# Patient Record
Sex: FEMALE | Race: OTHER | HISPANIC OR LATINO | URBAN - METROPOLITAN AREA
[De-identification: names, ages, dates, MRNs, and addresses within clinical notes are randomized per-mention and may not be internally consistent; named-entity substitution may affect disease eponyms.]

---
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
## 2022-03-19 NOTE — ED PEDIATRIC NURSE NOTE - PMH
A/P: 89 male who presents from a NH with coffee ground emesis, aspiration pneumonia, and a  pseudomonal UTI    Plan:  ICU    PULM: Aspiration PNA.  Continue 50% VM.  Continue Zosyn, CXR showing worsening B/L infiltrates.  Likely aspiration    Cardio: Increase lopressor to 25 q8h for better rate control    Renal: In MÓNICA improved    ENDO: RISS    GI: Continue Feeds, PPI    ID: Zosyn for PSA in the Urine and Asp PNA    GOC: Patient has been a DNR/DNI in the past.  Awaiting the sons decision No pertinent past medical history

## 2022-04-06 NOTE — ED PEDIATRIC NURSE NOTE - NS ED PATIENT SAFETY CONCERN
Faxed cardiac clearance to Dr. Gian Stewart.  Fax# [phone].  Faxed confirmation received./ MANNY      No

## 2022-05-16 NOTE — ED PROVIDER NOTE - CPE EDP RESP NORM
TELEPHONIC VISIT PROGRESS NOTE    This visit is being performed virtually via Telephonic Visit. Consent to treat includes permission to submit charges to the patient's insurance. It was shared that without being seen and evaluated in person, there is a risk that the information and/or assessment may be incomplete or inaccurate. This telephonic visit may be discontinued by patient or clinician, if it is felt that the telephonic connections are not adequate for her situation.     Clinical Location: [address]'s location Home and is physically present in   the [facility] at the time of this visit.       CHIEF COMPLAINT  Transitional Care Management (D/C 05-11-22) and Forms Completion ([place])      SUBJECTIVE  The patient is a 59 year old female who is being evaluated via a Telephonic Visit for medical follow-up after recent hospital stay related to COVID patient was admitted to the hospital on 05/09/2022 related to COVID symptoms including generalized fatigue, chills, body aches  She apparently was possibly exposed to COVID patient in work.  Already has received the 1st 2 doses of Moderna not had the booster.  No issues with breathing but did have elevated lactic acid so was given fluids and monitored in the hospital.  Patient states chills and body aches are better but she states she feels confused and will to think clearly.  Denies any diarrhea or fever chills.  Patient states does have issues with attention deficit disorder.  States feels like it is increased again.  Patient states her mind is wandering and unable to focus  Patient states today was the 1st day to come back to work and she states she feels very fatigued and even falling asleep at work she states her mind is wondering and unable to focus she is unable to function at work  REVIEW OF SYSTEMS  All systems reviewed and are negative with the exception of the findings noted in the history of present  illness.     PHYSICAL EXAM  There were no vitals filed for this visit.   She is alert, nontoxic with fluent speech      ASSESSMENT/PLAN  There are no diagnoses linked to this encounter.  1. Patient was diagnosed with COVID-19 on 05/09/2022 with hospitalization related to elevated lactic acid and dehydration.  Patient states has been trying to increase fluid intake  2.  ADD but patient to restart Adderall 10 mg daily.  3.  Major depression recurrent--on sertraline and seems to be doing fair  .  Plan follow-up in 2 weeks.  A note was written for employment that I would like to patient to be off of work from 05/09/2022 till 06/16/2022   I plan on seeing patient in the end of May to see how she is doing and see if she can return sooner  No LOS data to display  This includes pre-charting, chart review and documenting.    FOLLOW UP  Return in about 2 weeks (around 5/30/2022).  Approximately 35 minutes was spent on seeing patient, making recommendations, entering orders, and putting notes to Epic.  Greater than 50% of this visit was spent in counseling and coordination of care regarding this patient's medical condition.   normal...

## 2023-09-06 ENCOUNTER — EMERGENCY (EMERGENCY)
Facility: HOSPITAL | Age: 23
LOS: 1 days | Discharge: HOME | End: 2023-09-06
Attending: EMERGENCY MEDICINE
Payer: SELF-PAY

## 2023-09-06 VITALS
DIASTOLIC BLOOD PRESSURE: 74 MMHG | SYSTOLIC BLOOD PRESSURE: 106 MMHG | OXYGEN SATURATION: 99 % | RESPIRATION RATE: 20 BRPM

## 2023-09-06 VITALS
DIASTOLIC BLOOD PRESSURE: 75 MMHG | SYSTOLIC BLOOD PRESSURE: 109 MMHG | WEIGHT: 143.08 LBS | HEART RATE: 63 BPM | RESPIRATION RATE: 20 BRPM | OXYGEN SATURATION: 98 % | HEIGHT: 63 IN | TEMPERATURE: 98 F

## 2023-09-06 LAB
ALBUMIN SERPL ELPH-MCNC: 4 G/DL — SIGNIFICANT CHANGE UP (ref 3.3–5)
ALP SERPL-CCNC: 65 U/L — SIGNIFICANT CHANGE UP (ref 40–120)
ALT FLD-CCNC: 13 U/L — SIGNIFICANT CHANGE UP (ref 10–45)
ANION GAP SERPL CALC-SCNC: 10 MMOL/L — SIGNIFICANT CHANGE UP (ref 5–17)
APPEARANCE UR: ABNORMAL
AST SERPL-CCNC: 12 U/L — SIGNIFICANT CHANGE UP (ref 10–40)
BACTERIA # UR AUTO: ABNORMAL
BASOPHILS # BLD AUTO: 0.05 K/UL — SIGNIFICANT CHANGE UP (ref 0–0.2)
BASOPHILS NFR BLD AUTO: 0.5 % — SIGNIFICANT CHANGE UP (ref 0–2)
BILIRUB SERPL-MCNC: 0.2 MG/DL — SIGNIFICANT CHANGE UP (ref 0.2–1.2)
BILIRUB UR-MCNC: NEGATIVE — SIGNIFICANT CHANGE UP
BUN SERPL-MCNC: 14 MG/DL — SIGNIFICANT CHANGE UP (ref 7–23)
CALCIUM SERPL-MCNC: 8.9 MG/DL — SIGNIFICANT CHANGE UP (ref 8.4–10.5)
CHLORIDE SERPL-SCNC: 104 MMOL/L — SIGNIFICANT CHANGE UP (ref 96–108)
CO2 SERPL-SCNC: 22 MMOL/L — SIGNIFICANT CHANGE UP (ref 22–31)
COLOR SPEC: COLORLESS — SIGNIFICANT CHANGE UP
CREAT SERPL-MCNC: 0.59 MG/DL — SIGNIFICANT CHANGE UP (ref 0.5–1.3)
DIFF PNL FLD: ABNORMAL
EGFR: 132 ML/MIN/1.73M2 — SIGNIFICANT CHANGE UP
EOSINOPHIL # BLD AUTO: 0.17 K/UL — SIGNIFICANT CHANGE UP (ref 0–0.5)
EOSINOPHIL NFR BLD AUTO: 1.6 % — SIGNIFICANT CHANGE UP (ref 0–6)
EPI CELLS # UR: 5 /HPF — SIGNIFICANT CHANGE UP
GLUCOSE SERPL-MCNC: 98 MG/DL — SIGNIFICANT CHANGE UP (ref 70–99)
GLUCOSE UR QL: NEGATIVE — SIGNIFICANT CHANGE UP
HCG SERPL-ACNC: <2 MIU/ML — SIGNIFICANT CHANGE UP
HCT VFR BLD CALC: 35 % — SIGNIFICANT CHANGE UP (ref 34.5–45)
HGB BLD-MCNC: 11.8 G/DL — SIGNIFICANT CHANGE UP (ref 11.5–15.5)
HYALINE CASTS # UR AUTO: 2 /LPF — SIGNIFICANT CHANGE UP (ref 0–2)
IMM GRANULOCYTES NFR BLD AUTO: 0.5 % — SIGNIFICANT CHANGE UP (ref 0–0.9)
KETONES UR-MCNC: NEGATIVE — SIGNIFICANT CHANGE UP
LEUKOCYTE ESTERASE UR-ACNC: ABNORMAL
LYMPHOCYTES # BLD AUTO: 2.49 K/UL — SIGNIFICANT CHANGE UP (ref 1–3.3)
LYMPHOCYTES # BLD AUTO: 23.7 % — SIGNIFICANT CHANGE UP (ref 13–44)
MCHC RBC-ENTMCNC: 31.2 PG — SIGNIFICANT CHANGE UP (ref 27–34)
MCHC RBC-ENTMCNC: 33.7 GM/DL — SIGNIFICANT CHANGE UP (ref 32–36)
MCV RBC AUTO: 92.6 FL — SIGNIFICANT CHANGE UP (ref 80–100)
MONOCYTES # BLD AUTO: 0.69 K/UL — SIGNIFICANT CHANGE UP (ref 0–0.9)
MONOCYTES NFR BLD AUTO: 6.6 % — SIGNIFICANT CHANGE UP (ref 2–14)
NEUTROPHILS # BLD AUTO: 7.07 K/UL — SIGNIFICANT CHANGE UP (ref 1.8–7.4)
NEUTROPHILS NFR BLD AUTO: 67.1 % — SIGNIFICANT CHANGE UP (ref 43–77)
NITRITE UR-MCNC: NEGATIVE — SIGNIFICANT CHANGE UP
NRBC # BLD: 0 /100 WBCS — SIGNIFICANT CHANGE UP (ref 0–0)
PH UR: 6 — SIGNIFICANT CHANGE UP (ref 5–8)
PLATELET # BLD AUTO: 339 K/UL — SIGNIFICANT CHANGE UP (ref 150–400)
POTASSIUM SERPL-MCNC: 4.1 MMOL/L — SIGNIFICANT CHANGE UP (ref 3.5–5.3)
POTASSIUM SERPL-SCNC: 4.1 MMOL/L — SIGNIFICANT CHANGE UP (ref 3.5–5.3)
PROT SERPL-MCNC: 7.3 G/DL — SIGNIFICANT CHANGE UP (ref 6–8.3)
PROT UR-MCNC: ABNORMAL
RBC # BLD: 3.78 M/UL — LOW (ref 3.8–5.2)
RBC # FLD: 12 % — SIGNIFICANT CHANGE UP (ref 10.3–14.5)
RBC CASTS # UR COMP ASSIST: 24 /HPF — HIGH (ref 0–4)
SODIUM SERPL-SCNC: 136 MMOL/L — SIGNIFICANT CHANGE UP (ref 135–145)
SP GR SPEC: 1.02 — SIGNIFICANT CHANGE UP (ref 1.01–1.02)
UROBILINOGEN FLD QL: NEGATIVE — SIGNIFICANT CHANGE UP
WBC # BLD: 10.52 K/UL — HIGH (ref 3.8–10.5)
WBC # FLD AUTO: 10.52 K/UL — HIGH (ref 3.8–10.5)
WBC UR QL: 470 /HPF — HIGH (ref 0–5)

## 2023-09-06 PROCEDURE — 87086 URINE CULTURE/COLONY COUNT: CPT

## 2023-09-06 PROCEDURE — 76770 US EXAM ABDO BACK WALL COMP: CPT

## 2023-09-06 PROCEDURE — 80053 COMPREHEN METABOLIC PANEL: CPT

## 2023-09-06 PROCEDURE — 99285 EMERGENCY DEPT VISIT HI MDM: CPT | Mod: 25

## 2023-09-06 PROCEDURE — 87077 CULTURE AEROBIC IDENTIFY: CPT

## 2023-09-06 PROCEDURE — 85025 COMPLETE CBC W/AUTO DIFF WBC: CPT

## 2023-09-06 PROCEDURE — 93005 ELECTROCARDIOGRAM TRACING: CPT

## 2023-09-06 PROCEDURE — 96374 THER/PROPH/DIAG INJ IV PUSH: CPT

## 2023-09-06 PROCEDURE — 81001 URINALYSIS AUTO W/SCOPE: CPT

## 2023-09-06 PROCEDURE — 84702 CHORIONIC GONADOTROPIN TEST: CPT

## 2023-09-06 PROCEDURE — 99285 EMERGENCY DEPT VISIT HI MDM: CPT

## 2023-09-06 RX ORDER — MORPHINE SULFATE 50 MG/1
4 CAPSULE, EXTENDED RELEASE ORAL ONCE
Refills: 0 | Status: DISCONTINUED | OUTPATIENT
Start: 2023-09-06 | End: 2023-09-06

## 2023-09-06 RX ORDER — CEFPODOXIME PROXETIL 100 MG
1 TABLET ORAL
Qty: 20 | Refills: 0
Start: 2023-09-06 | End: 2023-11-01

## 2023-09-06 RX ORDER — KETOROLAC TROMETHAMINE 30 MG/ML
15 SYRINGE (ML) INJECTION ONCE
Refills: 0 | Status: DISCONTINUED | OUTPATIENT
Start: 2023-09-06 | End: 2023-09-06

## 2023-09-06 RX ORDER — CEFPODOXIME PROXETIL 100 MG
1 TABLET ORAL
Qty: 20 | Refills: 0
Start: 2023-09-06 | End: 2023-09-15

## 2023-09-06 RX ORDER — ACETAMINOPHEN 500 MG
650 TABLET ORAL EVERY 6 HOURS
Refills: 0 | Status: DISCONTINUED | OUTPATIENT
Start: 2023-09-06 | End: 2023-09-09

## 2023-09-06 RX ORDER — SODIUM CHLORIDE 9 MG/ML
1000 INJECTION, SOLUTION INTRAVENOUS ONCE
Refills: 0 | Status: COMPLETED | OUTPATIENT
Start: 2023-09-06 | End: 2023-09-06

## 2023-09-06 RX ORDER — CEFPODOXIME PROXETIL 100 MG
200 TABLET ORAL ONCE
Refills: 0 | Status: COMPLETED | OUTPATIENT
Start: 2023-09-06 | End: 2023-09-06

## 2023-09-06 RX ADMIN — Medication 15 MILLIGRAM(S): at 09:29

## 2023-09-06 RX ADMIN — Medication 200 MILLIGRAM(S): at 10:27

## 2023-09-06 RX ADMIN — SODIUM CHLORIDE 1000 MILLILITER(S): 9 INJECTION, SOLUTION INTRAVENOUS at 09:29

## 2023-09-06 NOTE — ED PROVIDER NOTE - PROGRESS NOTE DETAILS
attending Alfredo: results reviewed with patient. Tolerating PO. Plan for dc with PO abx, close PMD follow-up and strict return precautions

## 2023-09-06 NOTE — ED PROVIDER NOTE - ATTENDING CONTRIBUTION TO CARE
attending Alfredo: 22yF h/o multiple UTIs p/w L sided flank pain, reports h/o multiple prior UTIs. Reports dysuria and frequency for 2 weeks, now with pain since last night. Subjective fever 2 days ago. Exam as above. Concern for pyelonephritis. Will obtain labs, UA/Ucx, likely abx and outpatient management

## 2023-09-06 NOTE — ED PROVIDER NOTE - NSFOLLOWUPINSTRUCTIONS_ED_ALL_ED_FT
You are being treated for a kidney infection.  Stay well hydrated.  Follow-up with your primary doctor within 1-2 days. Bring a copy of your results with you  Take antibiotic as prescribed.   Complete full course as prescribed.      Return to an ER for worsening symptoms, fever, vomiting or any other concerns.

## 2023-09-06 NOTE — ED PROVIDER NOTE - PHYSICAL EXAMINATION
CONSTITUTIONAL: Well appearing, awake, alert, oriented to person, place, time/situation and in no apparent distress.  ENMT: Airway patent, Nasal mucosa clear. Mouth with normal mucosa  EYES: Clear bilaterally, pupils equal, round and reactive to light.  CARDIAC: Normal rate, regular rhythm.  Heart sounds S1, S2.  No murmurs, rubs or gallops.  RESPIRATORY: Breath sounds clear and equal bilaterally.  GASTROINTESTINAL: soft, nontender, no rebound, no guarding MUSCULOSKELETAL: No bruising, no lacerations, normal ROM, no deformity  : +CVAT left  NEUROLOGICAL: Alert and oriented, no focal deficits, no motor or sensory deficits.  SKIN: No bruising, no lacerations, no rash, no signs of external trauma

## 2023-09-06 NOTE — ED PROVIDER NOTE - NSDCPRINTRESULTS_ED_ALL_ED
Patient requests all Lab, Cardiology, and Radiology Results on their Discharge Instructions no concerns

## 2023-09-06 NOTE — ED PROVIDER NOTE - CLINICAL SUMMARY MEDICAL DECISION MAKING FREE TEXT BOX
Differential: UTI, pyelo, nephrolithiasis, pregnancy    22-year-old female multiple UTIs in the past presenting with 2 weeks of intermittent dysuria now spreading to the left flank.  Subjective fever at home, denying chills.  Hemodynamically stable and well-appearing here although does have positive CVA tenderness on the left.  Abdomen is soft and nontender.  No vaginal bleeding or discharge to suggest other etiologies such as ectopic pregnancy although will send pregnancy test.  High suspicion for pyelonephritis will get urinalysis.  Given overall stability and well appearance will hold off on CT scan pe pending further work-up. bedside US showing no signs of hydro, low suspcicion for stone

## 2023-09-06 NOTE — ED ADULT NURSE NOTE - OBJECTIVE STATEMENT
pt is a 22 yr old female with PMHx of multiple UTIs presenting to the emergency department due to left-sided flank pain.  Patient states that for the last 2 weeks on and off she been having some burning with urination and increased frequency.  Since last night the pain started to spread up to her left flank.  Has felt subjectively warm denying chills.

## 2023-09-06 NOTE — ED PROVIDER NOTE - OBJECTIVE STATEMENT
22-year-old female history of multiple UTIs presenting to the emergency department due to left-sided flank pain.  Patient states that for the last 2 weeks on and off she been having some burning with urination and increased frequency.  Since last night the pain started to spread up to her left flank.  Has felt subjectively warm denying chills.  Denies any abdominal pain.  Denies any vaginal bleeding or discharge.  Denies any abdominal pain

## 2023-09-06 NOTE — ED PROVIDER NOTE - PATIENT PORTAL LINK FT
You can access the FollowMyHealth Patient Portal offered by [facility] by registering at the following website: [email or web address]. By joining [facility]’s FollowMyHealth portal, you will also be able to view your health information using other applications (apps) compatible with our system.

## 2023-09-09 LAB
CULTURE RESULTS: SIGNIFICANT CHANGE UP
SPECIMEN SOURCE: SIGNIFICANT CHANGE UP

## 2023-09-09 NOTE — ED POST DISCHARGE NOTE - DETAILS
9/9/23 [facility] OROZCO- spoke with ID fellow Dr. Garcia, cefpodoxime should work in treatment of staphylococcus saprophyticus. spoke with patient, pt reports no fevers as previous but still having urinary symptoms. pt has taken 3 days of cefpodoxime. pt instructed to continue cefpodoxime and f/up with pcp on Monday. pt also instructed that if develops fever or symptoms worsen then she needs to return back to the hospital. pt understands and agrees to.

## 2023-10-22 ENCOUNTER — EMERGENCY (EMERGENCY)
Facility: HOSPITAL | Age: 23
LOS: 1 days | Discharge: HOME | End: 2023-10-22
Attending: STUDENT IN AN ORGANIZED HEALTH CARE EDUCATION/TRAINING PROGRAM
Payer: MEDICAID

## 2023-10-22 VITALS
DIASTOLIC BLOOD PRESSURE: 82 MMHG | TEMPERATURE: 99 F | WEIGHT: 139.99 LBS | HEART RATE: 127 BPM | OXYGEN SATURATION: 97 % | RESPIRATION RATE: 19 BRPM | HEIGHT: 63 IN | SYSTOLIC BLOOD PRESSURE: 119 MMHG

## 2023-10-22 PROCEDURE — 99284 EMERGENCY DEPT VISIT MOD MDM: CPT

## 2023-10-22 PROCEDURE — 71045 X-RAY EXAM CHEST 1 VIEW: CPT | Mod: 26

## 2023-10-22 NOTE — ED ADULT TRIAGE NOTE - CHIEF COMPLAINT QUOTE
Recent kidney infxn september.   C/o fevers, dysuria body aches and R flank pain.   Endorses similar symptoms to kidney infections x few days.

## 2023-10-23 VITALS
HEART RATE: 84 BPM | OXYGEN SATURATION: 96 % | DIASTOLIC BLOOD PRESSURE: 76 MMHG | RESPIRATION RATE: 16 BRPM | SYSTOLIC BLOOD PRESSURE: 113 MMHG | TEMPERATURE: 99 F

## 2023-10-23 LAB
ALBUMIN SERPL ELPH-MCNC: 4.4 G/DL — SIGNIFICANT CHANGE UP (ref 3.3–5)
ALBUMIN SERPL ELPH-MCNC: 4.4 G/DL — SIGNIFICANT CHANGE UP (ref 3.3–5)
ALP SERPL-CCNC: 83 U/L — SIGNIFICANT CHANGE UP (ref 40–120)
ALP SERPL-CCNC: 83 U/L — SIGNIFICANT CHANGE UP (ref 40–120)
ALT FLD-CCNC: 19 U/L — SIGNIFICANT CHANGE UP (ref 10–45)
ALT FLD-CCNC: 19 U/L — SIGNIFICANT CHANGE UP (ref 10–45)
ANION GAP SERPL CALC-SCNC: 16 MMOL/L — SIGNIFICANT CHANGE UP (ref 5–17)
ANION GAP SERPL CALC-SCNC: 16 MMOL/L — SIGNIFICANT CHANGE UP (ref 5–17)
APPEARANCE UR: ABNORMAL
APPEARANCE UR: ABNORMAL
APTT BLD: 29.5 SEC — SIGNIFICANT CHANGE UP (ref 24.5–35.6)
APTT BLD: 29.5 SEC — SIGNIFICANT CHANGE UP (ref 24.5–35.6)
AST SERPL-CCNC: 17 U/L — SIGNIFICANT CHANGE UP (ref 10–40)
AST SERPL-CCNC: 17 U/L — SIGNIFICANT CHANGE UP (ref 10–40)
BACTERIA # UR AUTO: ABNORMAL
BACTERIA # UR AUTO: ABNORMAL
BASE EXCESS BLDV CALC-SCNC: 0.5 MMOL/L — SIGNIFICANT CHANGE UP (ref -2–3)
BASE EXCESS BLDV CALC-SCNC: 0.5 MMOL/L — SIGNIFICANT CHANGE UP (ref -2–3)
BASOPHILS # BLD AUTO: 0.05 K/UL — SIGNIFICANT CHANGE UP (ref 0–0.2)
BASOPHILS # BLD AUTO: 0.05 K/UL — SIGNIFICANT CHANGE UP (ref 0–0.2)
BASOPHILS NFR BLD AUTO: 0.4 % — SIGNIFICANT CHANGE UP (ref 0–2)
BASOPHILS NFR BLD AUTO: 0.4 % — SIGNIFICANT CHANGE UP (ref 0–2)
BILIRUB SERPL-MCNC: 0.5 MG/DL — SIGNIFICANT CHANGE UP (ref 0.2–1.2)
BILIRUB SERPL-MCNC: 0.5 MG/DL — SIGNIFICANT CHANGE UP (ref 0.2–1.2)
BILIRUB UR-MCNC: NEGATIVE — SIGNIFICANT CHANGE UP
BILIRUB UR-MCNC: NEGATIVE — SIGNIFICANT CHANGE UP
BUN SERPL-MCNC: 6 MG/DL — LOW (ref 7–23)
BUN SERPL-MCNC: 6 MG/DL — LOW (ref 7–23)
CA-I SERPL-SCNC: 1.22 MMOL/L — SIGNIFICANT CHANGE UP (ref 1.15–1.33)
CA-I SERPL-SCNC: 1.22 MMOL/L — SIGNIFICANT CHANGE UP (ref 1.15–1.33)
CALCIUM SERPL-MCNC: 9.5 MG/DL — SIGNIFICANT CHANGE UP (ref 8.4–10.5)
CALCIUM SERPL-MCNC: 9.5 MG/DL — SIGNIFICANT CHANGE UP (ref 8.4–10.5)
CHLORIDE BLDV-SCNC: 104 MMOL/L — SIGNIFICANT CHANGE UP (ref 96–108)
CHLORIDE BLDV-SCNC: 104 MMOL/L — SIGNIFICANT CHANGE UP (ref 96–108)
CHLORIDE SERPL-SCNC: 104 MMOL/L — SIGNIFICANT CHANGE UP (ref 96–108)
CHLORIDE SERPL-SCNC: 104 MMOL/L — SIGNIFICANT CHANGE UP (ref 96–108)
CO2 BLDV-SCNC: 26 MMOL/L — SIGNIFICANT CHANGE UP (ref 22–26)
CO2 BLDV-SCNC: 26 MMOL/L — SIGNIFICANT CHANGE UP (ref 22–26)
CO2 SERPL-SCNC: 18 MMOL/L — LOW (ref 22–31)
CO2 SERPL-SCNC: 18 MMOL/L — LOW (ref 22–31)
COLOR SPEC: SIGNIFICANT CHANGE UP
COLOR SPEC: SIGNIFICANT CHANGE UP
CREAT SERPL-MCNC: 0.59 MG/DL — SIGNIFICANT CHANGE UP (ref 0.5–1.3)
CREAT SERPL-MCNC: 0.59 MG/DL — SIGNIFICANT CHANGE UP (ref 0.5–1.3)
DIFF PNL FLD: ABNORMAL
DIFF PNL FLD: ABNORMAL
EGFR: 131 ML/MIN/1.73M2 — SIGNIFICANT CHANGE UP
EGFR: 131 ML/MIN/1.73M2 — SIGNIFICANT CHANGE UP
EOSINOPHIL # BLD AUTO: 0.19 K/UL — SIGNIFICANT CHANGE UP (ref 0–0.5)
EOSINOPHIL # BLD AUTO: 0.19 K/UL — SIGNIFICANT CHANGE UP (ref 0–0.5)
EOSINOPHIL NFR BLD AUTO: 1.5 % — SIGNIFICANT CHANGE UP (ref 0–6)
EOSINOPHIL NFR BLD AUTO: 1.5 % — SIGNIFICANT CHANGE UP (ref 0–6)
EPI CELLS # UR: 11 /HPF — HIGH
EPI CELLS # UR: 11 /HPF — HIGH
GAS PNL BLDV: 133 MMOL/L — LOW (ref 136–145)
GAS PNL BLDV: 133 MMOL/L — LOW (ref 136–145)
GAS PNL BLDV: SIGNIFICANT CHANGE UP
GLUCOSE BLDV-MCNC: 106 MG/DL — HIGH (ref 70–99)
GLUCOSE BLDV-MCNC: 106 MG/DL — HIGH (ref 70–99)
GLUCOSE SERPL-MCNC: 98 MG/DL — SIGNIFICANT CHANGE UP (ref 70–99)
GLUCOSE SERPL-MCNC: 98 MG/DL — SIGNIFICANT CHANGE UP (ref 70–99)
GLUCOSE UR QL: NEGATIVE — SIGNIFICANT CHANGE UP
GLUCOSE UR QL: NEGATIVE — SIGNIFICANT CHANGE UP
HCG SERPL-ACNC: <2 MIU/ML — SIGNIFICANT CHANGE UP
HCG SERPL-ACNC: <2 MIU/ML — SIGNIFICANT CHANGE UP
HCO3 BLDV-SCNC: 25 MMOL/L — SIGNIFICANT CHANGE UP (ref 22–29)
HCO3 BLDV-SCNC: 25 MMOL/L — SIGNIFICANT CHANGE UP (ref 22–29)
HCT VFR BLD CALC: 39.5 % — SIGNIFICANT CHANGE UP (ref 34.5–45)
HCT VFR BLD CALC: 39.5 % — SIGNIFICANT CHANGE UP (ref 34.5–45)
HCT VFR BLDA CALC: 42 % — SIGNIFICANT CHANGE UP (ref 34.5–46.5)
HCT VFR BLDA CALC: 42 % — SIGNIFICANT CHANGE UP (ref 34.5–46.5)
HGB BLD CALC-MCNC: 13.9 G/DL — SIGNIFICANT CHANGE UP (ref 11.7–16.1)
HGB BLD CALC-MCNC: 13.9 G/DL — SIGNIFICANT CHANGE UP (ref 11.7–16.1)
HGB BLD-MCNC: 13.2 G/DL — SIGNIFICANT CHANGE UP (ref 11.5–15.5)
HGB BLD-MCNC: 13.2 G/DL — SIGNIFICANT CHANGE UP (ref 11.5–15.5)
HOROWITZ INDEX BLDV+IHG-RTO: SIGNIFICANT CHANGE UP
HOROWITZ INDEX BLDV+IHG-RTO: SIGNIFICANT CHANGE UP
HYALINE CASTS # UR AUTO: 8 /LPF — HIGH (ref 0–2)
HYALINE CASTS # UR AUTO: 8 /LPF — HIGH (ref 0–2)
IMM GRANULOCYTES NFR BLD AUTO: 0.3 % — SIGNIFICANT CHANGE UP (ref 0–0.9)
IMM GRANULOCYTES NFR BLD AUTO: 0.3 % — SIGNIFICANT CHANGE UP (ref 0–0.9)
INR BLD: 1.14 RATIO — SIGNIFICANT CHANGE UP (ref 0.85–1.18)
INR BLD: 1.14 RATIO — SIGNIFICANT CHANGE UP (ref 0.85–1.18)
KETONES UR-MCNC: NEGATIVE — SIGNIFICANT CHANGE UP
KETONES UR-MCNC: NEGATIVE — SIGNIFICANT CHANGE UP
LACTATE BLDV-MCNC: 1 MMOL/L — SIGNIFICANT CHANGE UP (ref 0.5–2)
LACTATE BLDV-MCNC: 1 MMOL/L — SIGNIFICANT CHANGE UP (ref 0.5–2)
LEUKOCYTE ESTERASE UR-ACNC: ABNORMAL
LEUKOCYTE ESTERASE UR-ACNC: ABNORMAL
LYMPHOCYTES # BLD AUTO: 15.5 % — SIGNIFICANT CHANGE UP (ref 13–44)
LYMPHOCYTES # BLD AUTO: 15.5 % — SIGNIFICANT CHANGE UP (ref 13–44)
LYMPHOCYTES # BLD AUTO: 2 K/UL — SIGNIFICANT CHANGE UP (ref 1–3.3)
LYMPHOCYTES # BLD AUTO: 2 K/UL — SIGNIFICANT CHANGE UP (ref 1–3.3)
MCHC RBC-ENTMCNC: 29.9 PG — SIGNIFICANT CHANGE UP (ref 27–34)
MCHC RBC-ENTMCNC: 29.9 PG — SIGNIFICANT CHANGE UP (ref 27–34)
MCHC RBC-ENTMCNC: 33.4 GM/DL — SIGNIFICANT CHANGE UP (ref 32–36)
MCHC RBC-ENTMCNC: 33.4 GM/DL — SIGNIFICANT CHANGE UP (ref 32–36)
MCV RBC AUTO: 89.6 FL — SIGNIFICANT CHANGE UP (ref 80–100)
MCV RBC AUTO: 89.6 FL — SIGNIFICANT CHANGE UP (ref 80–100)
MONOCYTES # BLD AUTO: 0.7 K/UL — SIGNIFICANT CHANGE UP (ref 0–0.9)
MONOCYTES # BLD AUTO: 0.7 K/UL — SIGNIFICANT CHANGE UP (ref 0–0.9)
MONOCYTES NFR BLD AUTO: 5.4 % — SIGNIFICANT CHANGE UP (ref 2–14)
MONOCYTES NFR BLD AUTO: 5.4 % — SIGNIFICANT CHANGE UP (ref 2–14)
NEUTROPHILS # BLD AUTO: 9.89 K/UL — HIGH (ref 1.8–7.4)
NEUTROPHILS # BLD AUTO: 9.89 K/UL — HIGH (ref 1.8–7.4)
NEUTROPHILS NFR BLD AUTO: 76.9 % — SIGNIFICANT CHANGE UP (ref 43–77)
NEUTROPHILS NFR BLD AUTO: 76.9 % — SIGNIFICANT CHANGE UP (ref 43–77)
NITRITE UR-MCNC: NEGATIVE — SIGNIFICANT CHANGE UP
NITRITE UR-MCNC: NEGATIVE — SIGNIFICANT CHANGE UP
NRBC # BLD: 0 /100 WBCS — SIGNIFICANT CHANGE UP (ref 0–0)
NRBC # BLD: 0 /100 WBCS — SIGNIFICANT CHANGE UP (ref 0–0)
PCO2 BLDV: 37 MMHG — LOW (ref 39–42)
PCO2 BLDV: 37 MMHG — LOW (ref 39–42)
PH BLDV: 7.43 — SIGNIFICANT CHANGE UP (ref 7.32–7.43)
PH BLDV: 7.43 — SIGNIFICANT CHANGE UP (ref 7.32–7.43)
PH UR: 6 — SIGNIFICANT CHANGE UP (ref 5–8)
PH UR: 6 — SIGNIFICANT CHANGE UP (ref 5–8)
PLATELET # BLD AUTO: 342 K/UL — SIGNIFICANT CHANGE UP (ref 150–400)
PLATELET # BLD AUTO: 342 K/UL — SIGNIFICANT CHANGE UP (ref 150–400)
PO2 BLDV: 39 MMHG — SIGNIFICANT CHANGE UP (ref 25–45)
PO2 BLDV: 39 MMHG — SIGNIFICANT CHANGE UP (ref 25–45)
POTASSIUM BLDV-SCNC: 3.6 MMOL/L — SIGNIFICANT CHANGE UP (ref 3.5–5.1)
POTASSIUM BLDV-SCNC: 3.6 MMOL/L — SIGNIFICANT CHANGE UP (ref 3.5–5.1)
POTASSIUM SERPL-MCNC: 3.7 MMOL/L — SIGNIFICANT CHANGE UP (ref 3.5–5.3)
POTASSIUM SERPL-MCNC: 3.7 MMOL/L — SIGNIFICANT CHANGE UP (ref 3.5–5.3)
POTASSIUM SERPL-SCNC: 3.7 MMOL/L — SIGNIFICANT CHANGE UP (ref 3.5–5.3)
POTASSIUM SERPL-SCNC: 3.7 MMOL/L — SIGNIFICANT CHANGE UP (ref 3.5–5.3)
PROCALCITONIN SERPL-MCNC: 0.06 NG/ML — SIGNIFICANT CHANGE UP (ref 0.02–0.1)
PROCALCITONIN SERPL-MCNC: 0.06 NG/ML — SIGNIFICANT CHANGE UP (ref 0.02–0.1)
PROT SERPL-MCNC: 8 G/DL — SIGNIFICANT CHANGE UP (ref 6–8.3)
PROT SERPL-MCNC: 8 G/DL — SIGNIFICANT CHANGE UP (ref 6–8.3)
PROT UR-MCNC: ABNORMAL
PROT UR-MCNC: ABNORMAL
PROTHROM AB SERPL-ACNC: 11.9 SEC — SIGNIFICANT CHANGE UP (ref 9.5–13)
PROTHROM AB SERPL-ACNC: 11.9 SEC — SIGNIFICANT CHANGE UP (ref 9.5–13)
RAPID RVP RESULT: DETECTED
RAPID RVP RESULT: DETECTED
RBC # BLD: 4.41 M/UL — SIGNIFICANT CHANGE UP (ref 3.8–5.2)
RBC # BLD: 4.41 M/UL — SIGNIFICANT CHANGE UP (ref 3.8–5.2)
RBC # FLD: 11.8 % — SIGNIFICANT CHANGE UP (ref 10.3–14.5)
RBC # FLD: 11.8 % — SIGNIFICANT CHANGE UP (ref 10.3–14.5)
RBC CASTS # UR COMP ASSIST: 3 /HPF — SIGNIFICANT CHANGE UP (ref 0–4)
RBC CASTS # UR COMP ASSIST: 3 /HPF — SIGNIFICANT CHANGE UP (ref 0–4)
RV+EV RNA SPEC QL NAA+PROBE: DETECTED
RV+EV RNA SPEC QL NAA+PROBE: DETECTED
SAO2 % BLDV: 72 % — SIGNIFICANT CHANGE UP (ref 67–88)
SAO2 % BLDV: 72 % — SIGNIFICANT CHANGE UP (ref 67–88)
SARS-COV-2 RNA SPEC QL NAA+PROBE: SIGNIFICANT CHANGE UP
SARS-COV-2 RNA SPEC QL NAA+PROBE: SIGNIFICANT CHANGE UP
SODIUM SERPL-SCNC: 138 MMOL/L — SIGNIFICANT CHANGE UP (ref 135–145)
SODIUM SERPL-SCNC: 138 MMOL/L — SIGNIFICANT CHANGE UP (ref 135–145)
SP GR SPEC: 1.01 — SIGNIFICANT CHANGE UP (ref 1.01–1.02)
SP GR SPEC: 1.01 — SIGNIFICANT CHANGE UP (ref 1.01–1.02)
UROBILINOGEN FLD QL: NEGATIVE — SIGNIFICANT CHANGE UP
UROBILINOGEN FLD QL: NEGATIVE — SIGNIFICANT CHANGE UP
WBC # BLD: 12.87 K/UL — HIGH (ref 3.8–10.5)
WBC # BLD: 12.87 K/UL — HIGH (ref 3.8–10.5)
WBC # FLD AUTO: 12.87 K/UL — HIGH (ref 3.8–10.5)
WBC # FLD AUTO: 12.87 K/UL — HIGH (ref 3.8–10.5)
WBC UR QL: 34 /HPF — HIGH (ref 0–5)
WBC UR QL: 34 /HPF — HIGH (ref 0–5)

## 2023-10-23 PROCEDURE — 85730 THROMBOPLASTIN TIME PARTIAL: CPT

## 2023-10-23 PROCEDURE — 0225U NFCT DS DNA&RNA 21 SARSCOV2: CPT

## 2023-10-23 PROCEDURE — 84145 PROCALCITONIN (PCT): CPT

## 2023-10-23 PROCEDURE — 82435 ASSAY OF BLOOD CHLORIDE: CPT

## 2023-10-23 PROCEDURE — 85018 HEMOGLOBIN: CPT

## 2023-10-23 PROCEDURE — 87040 BLOOD CULTURE FOR BACTERIA: CPT

## 2023-10-23 PROCEDURE — 82947 ASSAY GLUCOSE BLOOD QUANT: CPT

## 2023-10-23 PROCEDURE — 96375 TX/PRO/DX INJ NEW DRUG ADDON: CPT

## 2023-10-23 PROCEDURE — 82330 ASSAY OF CALCIUM: CPT

## 2023-10-23 PROCEDURE — 87086 URINE CULTURE/COLONY COUNT: CPT

## 2023-10-23 PROCEDURE — 85025 COMPLETE CBC W/AUTO DIFF WBC: CPT

## 2023-10-23 PROCEDURE — 84295 ASSAY OF SERUM SODIUM: CPT

## 2023-10-23 PROCEDURE — 83605 ASSAY OF LACTIC ACID: CPT

## 2023-10-23 PROCEDURE — 84702 CHORIONIC GONADOTROPIN TEST: CPT

## 2023-10-23 PROCEDURE — 82803 BLOOD GASES ANY COMBINATION: CPT

## 2023-10-23 PROCEDURE — 93005 ELECTROCARDIOGRAM TRACING: CPT

## 2023-10-23 PROCEDURE — 81001 URINALYSIS AUTO W/SCOPE: CPT

## 2023-10-23 PROCEDURE — 84132 ASSAY OF SERUM POTASSIUM: CPT

## 2023-10-23 PROCEDURE — 99285 EMERGENCY DEPT VISIT HI MDM: CPT | Mod: 25

## 2023-10-23 PROCEDURE — 85014 HEMATOCRIT: CPT

## 2023-10-23 PROCEDURE — 96374 THER/PROPH/DIAG INJ IV PUSH: CPT

## 2023-10-23 PROCEDURE — 85610 PROTHROMBIN TIME: CPT

## 2023-10-23 PROCEDURE — 80053 COMPREHEN METABOLIC PANEL: CPT

## 2023-10-23 PROCEDURE — 71045 X-RAY EXAM CHEST 1 VIEW: CPT

## 2023-10-23 RX ORDER — OXYMETAZOLINE HYDROCHLORIDE 0.5 MG/ML
2 SPRAY NASAL ONCE
Refills: 0 | Status: COMPLETED | OUTPATIENT
Start: 2023-10-23 | End: 2023-10-23

## 2023-10-23 RX ORDER — CEFTRIAXONE 500 MG/1
1000 INJECTION, POWDER, FOR SOLUTION INTRAMUSCULAR; INTRAVENOUS ONCE
Refills: 0 | Status: COMPLETED | OUTPATIENT
Start: 2023-10-23 | End: 2023-10-23

## 2023-10-23 RX ORDER — KETOROLAC TROMETHAMINE 30 MG/ML
15 SYRINGE (ML) INJECTION ONCE
Refills: 0 | Status: DISCONTINUED | OUTPATIENT
Start: 2023-10-23 | End: 2023-10-23

## 2023-10-23 RX ORDER — SODIUM CHLORIDE 9 MG/ML
1000 INJECTION, SOLUTION INTRAVENOUS ONCE
Refills: 0 | Status: DISCONTINUED | OUTPATIENT
Start: 2023-10-23 | End: 2023-10-23

## 2023-10-23 RX ORDER — SODIUM CHLORIDE 9 MG/ML
1000 INJECTION INTRAMUSCULAR; INTRAVENOUS; SUBCUTANEOUS ONCE
Refills: 0 | Status: COMPLETED | OUTPATIENT
Start: 2023-10-23 | End: 2023-10-23

## 2023-10-23 RX ORDER — ACETAMINOPHEN 500 MG
1000 TABLET ORAL ONCE
Refills: 0 | Status: COMPLETED | OUTPATIENT
Start: 2023-10-23 | End: 2023-10-23

## 2023-10-23 RX ADMIN — OXYMETAZOLINE HYDROCHLORIDE 2 SPRAY(S): 0.5 SPRAY NASAL at 00:45

## 2023-10-23 RX ADMIN — CEFTRIAXONE 100 MILLIGRAM(S): 500 INJECTION, POWDER, FOR SOLUTION INTRAMUSCULAR; INTRAVENOUS at 02:03

## 2023-10-23 RX ADMIN — Medication 400 MILLIGRAM(S): at 00:47

## 2023-10-23 RX ADMIN — SODIUM CHLORIDE 1000 MILLILITER(S): 9 INJECTION INTRAMUSCULAR; INTRAVENOUS; SUBCUTANEOUS at 01:59

## 2023-10-23 RX ADMIN — Medication 15 MILLIGRAM(S): at 00:46

## 2023-10-23 NOTE — ED PROVIDER NOTE - PROGRESS NOTE DETAILS
Pt reassessed and feeling much improved after symptom control and antibiotics. Will be sure to educate her regarding antibiotic use and the importance of taking the full course. Will send rx to her pharmacy

## 2023-10-23 NOTE — ED PROVIDER NOTE - OBJECTIVE STATEMENT
23-year-old female with past medical history of nothing presents to the ED with several days of subjective fever, dysuria, back pain, sore throat, body aches, congestion, BL ear aches. Patient presented to her primary care doctor a week ago for potential urinary tract infection and was given Cipro twice daily for 10 days.  Patient took 3 days worth of the antibiotics and then stopped 2 days ago because she was "taking too many pills."  Patient states that she was taking Tylenol, ibuprofen, takes no other medication.  Patient is concerned she now is has pyelonephritis as she is having back pain and right flank pain.  Previous diagnoses of pyelonephritis in the past.  Patient admits to having trouble breathing through her nose due to nasal congestion.  Cough has been nonproductive.  Patient admits to vomiting this morning.  Pt has had little appetite and is not eating, drinking as much as usual. Patient admits to THC vape pen which she uses daily.  Last use in the car on the way to the hospital. Pt denies SOB, CP, abdominal pain, HA, numbness, recent sick contacts, recent travel, abnormal vaginal discharge. 23-year-old female with past medical history of nothing presents to the ED with several days of subjective fever, dysuria, sore throat, body aches, congestion, BL ear aches. Patient presented to her primary care doctor a week ago for potential urinary tract infection and was given Cipro twice daily for 10 days.  Patient took 3 days worth of the antibiotics and then stopped 2 days ago because she was "taking too many pills."  Patient states that she was taking Tylenol, ibuprofen, takes no other medication. Patient admits to having trouble breathing through her nose due to nasal congestion.  Cough has been nonproductive.  Patient admits to vomiting this morning.  Pt has had little appetite and is not eating, drinking as much as usual. Patient admits to THC vape pen which she uses daily.  Last use in the car on the way to the hospital. Pt denies SOB, CP, abdominal pain, HA, numbness, recent sick contacts, recent travel, abnormal vaginal discharge.

## 2023-10-23 NOTE — ED POST DISCHARGE NOTE - DETAILS
10/23/23: results d/w pt who is having improvement of sx, is also on abx for UTI feels improved. patient was given strict followup instructions and return precautions. pt verbalized understanding and was appreciative of call. - Shashi De Leon PA-C Occupational Therapy Note:   [facility] [employer] [facility]. ( skilled , 80-89 Instruction Recommended, and 79 or less Discontinue)    It is recommended that the patient limit highway driving, but should be able to continue daily driving in town.

## 2023-10-23 NOTE — ED PROVIDER NOTE - ATTENDING CONTRIBUTION TO CARE
Attending (Henrik Kasper D.O.):  I have personally seen and examined this patient. I have performed a substantive portion of the visit including all aspects of the medical decision making. Resident, fellow, student, and/or ACP note reviewed. I agree on the plan of care except where noted.    young female with hx of uti/kidney infx in past, here with lower back pain, dysuria, abd pain with urination, ear warmth, sore throat, nonprod cough, body aches xfew days, dx with uti, started on cipro, stopped 3 days ago, only taking antipyretic/analgesia now. Still tolerating PO. Hemodynam stable, NAD, Tachycardic, feels warm, slightly dry mucous membranes, + suprapubic ttp, + left cva ttp, no jaundice, no anemia, no bruising. Clear oropharynx, no pooling, no exudate. Notmeningeal. Tachy rate but regular. Hx and exam suggestive of complicated uti, nontoxic appearing though, maybe superimposed viral infx. Plan for labs, hydration, UA, abxs, supportive care. Do not think acute bowel pathology or renal abscess requiring CT at this time. DDx and utility of lab testing and advanced imaging being ordered discussed w/ patient. Verbal understanding expressed. Agreeable to plan. -> labs suggestive of complicated cystitis. Patient feels improved, wants to go home. Patient is hemodynamically stable, feels improved. All w/u, results discussed at length w/ patient. All questions answered. Strict return precautions provided w/ verbal understanding expressed. Stable for dc w/ close outpt f/u.

## 2023-10-23 NOTE — ED ADULT NURSE NOTE - OBJECTIVE STATEMENT
Pt is a 22 y/o female with PMH recent kidney infection 1 month ago presenting to the ED c/o multiple complaints. Pt reports R flank pain with chills x3 days, pt saw her PCP and was put on abx. Pt states 1 day ago began having onset of fever, nasal congestion, worsening pain, burning on urination, and n/v. Pt states this is how she felt with previous kidney infection. Pt states 9/10 pain at this time, denies taking pharmacologic measures prior to arrival. Pt denies chest pain, SOB, abdominal pain, hematuria.

## 2023-10-23 NOTE — ED PROVIDER NOTE - PHYSICAL EXAMINATION
Gen: NAD, AAOx3, non-toxic appearing  HEENT: NCAT, normal conjunctiva, oral mucosa moist  Lung: speaking in full sentences, good aeration bilaterally, lungs CTA b/l  CV: regular rate and rhythm. cap refill <2x. peripheral pulses 2+bilaterally   Abd: soft, ND, NT  MSK: no visible deformities  Neuro: No focal deficits  Skin: Intact  Psych: normal affect, slightly anxious about her symptoms but appropriately concerned

## 2023-10-23 NOTE — ED PROVIDER NOTE - CLINICAL SUMMARY MEDICAL DECISION MAKING FREE TEXT BOX
Likely UTI in conjunction with viral URI. Will get CHX, CBC, CMP, UA, UC. Pt otherwise young and healthy, will likely dispo home with return precautions if symptoms improve with tylenol/toradol.

## 2023-10-23 NOTE — ED PROVIDER NOTE - PATIENT PORTAL LINK FT
You can access the FollowMyHealth Patient Portal offered by [facility] by registering at the following website: [email or web address]. By joining [employer]’s FollowMyHealth portal, you will also be able to view your health information using other applications (apps) compatible with our system.

## 2023-10-23 NOTE — ED ADULT NURSE NOTE - CAS ELECT INFOMATION PROVIDED
pt instructed to follow up with PCP outpatient, return to ED with worsening s/s. Pt verbalizes understanding./DC instructions

## 2023-10-23 NOTE — ED PROVIDER NOTE - NSFOLLOWUPINSTRUCTIONS_ED_ALL_ED_FT
1. You presented to the emergency department for: UTI. You have a urinary tract infection. Please make sure you complete the FULL COURSE of antibiotics we prescribe to you, even if you start to feel better before you have finished them. If you do not, the infection may not completely go away and the pain may return.     2. Your evaluation in the emergency department included a physician evaluation. Your work-up did not reveal any findings indicating the need for admission to the hospital or any emergent interventions at this time.     3. It is recommended that you follow-up with your primary care doctor for a repeat evaluation, and potentially further testing and treatment.     If needed, to arrange an appointment with a primary care provider please call: [phone]    4. Please continue taking your regular medications as prescribed.     For pain you may take 400-600 mg IBUPROFEN or 500-1000mg ACETAMINOPHEN every 6-8 hours - as needed.  This is an over-the-counter medication - please read the instructions for use and warnings on the label. If you have any questions regarding its use, you may refer them to your local pharmacist.    5. PLEASE RETURN TO THE EMERGENCY DEPARTMENT IMMEDIATELY IF you develop any fevers not responding to over the counter medications, uncontrollable nausea and vomiting, an inability to tolerate eating and drinking, difficulty breathing, chest pain, a severe increase in your symptoms or pain, or any other new symptoms that concern you.

## 2023-10-24 LAB
CULTURE RESULTS: SIGNIFICANT CHANGE UP
CULTURE RESULTS: SIGNIFICANT CHANGE UP
SPECIMEN SOURCE: SIGNIFICANT CHANGE UP
SPECIMEN SOURCE: SIGNIFICANT CHANGE UP

## 2023-10-28 LAB
CULTURE RESULTS: SIGNIFICANT CHANGE UP
SPECIMEN SOURCE: SIGNIFICANT CHANGE UP

## 2024-02-22 ENCOUNTER — EMERGENCY (EMERGENCY)
Facility: HOSPITAL | Age: 24
LOS: 1 days | Discharge: HOME | End: 2024-02-22
Attending: EMERGENCY MEDICINE
Payer: MEDICAID

## 2024-02-22 VITALS
SYSTOLIC BLOOD PRESSURE: 97 MMHG | HEART RATE: 83 BPM | RESPIRATION RATE: 16 BRPM | TEMPERATURE: 98 F | DIASTOLIC BLOOD PRESSURE: 58 MMHG | OXYGEN SATURATION: 100 %

## 2024-02-22 VITALS
HEART RATE: 85 BPM | SYSTOLIC BLOOD PRESSURE: 117 MMHG | OXYGEN SATURATION: 98 % | TEMPERATURE: 98 F | HEIGHT: 63 IN | WEIGHT: 143.08 LBS | DIASTOLIC BLOOD PRESSURE: 89 MMHG | RESPIRATION RATE: 18 BRPM

## 2024-02-22 LAB
ALBUMIN SERPL ELPH-MCNC: 4.3 G/DL — SIGNIFICANT CHANGE UP (ref 3.3–5)
ALP SERPL-CCNC: 81 U/L — SIGNIFICANT CHANGE UP (ref 40–120)
ALT FLD-CCNC: 19 U/L — SIGNIFICANT CHANGE UP (ref 10–45)
ANION GAP SERPL CALC-SCNC: 12 MMOL/L — SIGNIFICANT CHANGE UP (ref 5–17)
APPEARANCE UR: ABNORMAL
AST SERPL-CCNC: 21 U/L — SIGNIFICANT CHANGE UP (ref 10–40)
BACTERIA # UR AUTO: ABNORMAL /HPF
BASOPHILS # BLD AUTO: 0.04 K/UL — SIGNIFICANT CHANGE UP (ref 0–0.2)
BASOPHILS NFR BLD AUTO: 0.5 % — SIGNIFICANT CHANGE UP (ref 0–2)
BILIRUB SERPL-MCNC: 0.6 MG/DL — SIGNIFICANT CHANGE UP (ref 0.2–1.2)
BILIRUB UR-MCNC: NEGATIVE — SIGNIFICANT CHANGE UP
BUN SERPL-MCNC: 9 MG/DL — SIGNIFICANT CHANGE UP (ref 7–23)
CALCIUM SERPL-MCNC: 9 MG/DL — SIGNIFICANT CHANGE UP (ref 8.4–10.5)
CAST: 6 /LPF — HIGH (ref 0–4)
CHLORIDE SERPL-SCNC: 104 MMOL/L — SIGNIFICANT CHANGE UP (ref 96–108)
CO2 SERPL-SCNC: 21 MMOL/L — LOW (ref 22–31)
COLOR SPEC: YELLOW — SIGNIFICANT CHANGE UP
CREAT SERPL-MCNC: 0.6 MG/DL — SIGNIFICANT CHANGE UP (ref 0.5–1.3)
DIFF PNL FLD: ABNORMAL
EGFR: 131 ML/MIN/1.73M2 — SIGNIFICANT CHANGE UP
EOSINOPHIL # BLD AUTO: 0.12 K/UL — SIGNIFICANT CHANGE UP (ref 0–0.5)
EOSINOPHIL NFR BLD AUTO: 1.6 % — SIGNIFICANT CHANGE UP (ref 0–6)
GLUCOSE SERPL-MCNC: 99 MG/DL — SIGNIFICANT CHANGE UP (ref 70–99)
GLUCOSE UR QL: NEGATIVE MG/DL — SIGNIFICANT CHANGE UP
HCG SERPL-ACNC: <2 MIU/ML — SIGNIFICANT CHANGE UP
HCT VFR BLD CALC: 37 % — SIGNIFICANT CHANGE UP (ref 34.5–45)
HGB BLD-MCNC: 12.1 G/DL — SIGNIFICANT CHANGE UP (ref 11.5–15.5)
IMM GRANULOCYTES NFR BLD AUTO: 0.3 % — SIGNIFICANT CHANGE UP (ref 0–0.9)
KETONES UR-MCNC: NEGATIVE MG/DL — SIGNIFICANT CHANGE UP
LEUKOCYTE ESTERASE UR-ACNC: ABNORMAL
LIDOCAIN IGE QN: 23 U/L — SIGNIFICANT CHANGE UP (ref 7–60)
LYMPHOCYTES # BLD AUTO: 2.09 K/UL — SIGNIFICANT CHANGE UP (ref 1–3.3)
LYMPHOCYTES # BLD AUTO: 28.2 % — SIGNIFICANT CHANGE UP (ref 13–44)
MCHC RBC-ENTMCNC: 29.7 PG — SIGNIFICANT CHANGE UP (ref 27–34)
MCHC RBC-ENTMCNC: 32.7 GM/DL — SIGNIFICANT CHANGE UP (ref 32–36)
MCV RBC AUTO: 90.9 FL — SIGNIFICANT CHANGE UP (ref 80–100)
MONOCYTES # BLD AUTO: 0.54 K/UL — SIGNIFICANT CHANGE UP (ref 0–0.9)
MONOCYTES NFR BLD AUTO: 7.3 % — SIGNIFICANT CHANGE UP (ref 2–14)
NEUTROPHILS # BLD AUTO: 4.59 K/UL — SIGNIFICANT CHANGE UP (ref 1.8–7.4)
NEUTROPHILS NFR BLD AUTO: 62.1 % — SIGNIFICANT CHANGE UP (ref 43–77)
NITRITE UR-MCNC: NEGATIVE — SIGNIFICANT CHANGE UP
NRBC # BLD: 0 /100 WBCS — SIGNIFICANT CHANGE UP (ref 0–0)
PH UR: 5.5 — SIGNIFICANT CHANGE UP (ref 5–8)
PLATELET # BLD AUTO: 331 K/UL — SIGNIFICANT CHANGE UP (ref 150–400)
POTASSIUM SERPL-MCNC: 3.6 MMOL/L — SIGNIFICANT CHANGE UP (ref 3.5–5.3)
POTASSIUM SERPL-SCNC: 3.6 MMOL/L — SIGNIFICANT CHANGE UP (ref 3.5–5.3)
PROT SERPL-MCNC: 7.6 G/DL — SIGNIFICANT CHANGE UP (ref 6–8.3)
PROT UR-MCNC: SIGNIFICANT CHANGE UP MG/DL
RBC # BLD: 4.07 M/UL — SIGNIFICANT CHANGE UP (ref 3.8–5.2)
RBC # FLD: 12.6 % — SIGNIFICANT CHANGE UP (ref 10.3–14.5)
RBC CASTS # UR COMP ASSIST: 4 /HPF — SIGNIFICANT CHANGE UP (ref 0–4)
REVIEW: SIGNIFICANT CHANGE UP
SODIUM SERPL-SCNC: 137 MMOL/L — SIGNIFICANT CHANGE UP (ref 135–145)
SP GR SPEC: 1.02 — SIGNIFICANT CHANGE UP (ref 1–1.03)
SQUAMOUS # UR AUTO: 12 /HPF — HIGH (ref 0–5)
UROBILINOGEN FLD QL: 1 MG/DL — SIGNIFICANT CHANGE UP (ref 0.2–1)
WBC # BLD: 7.4 K/UL — SIGNIFICANT CHANGE UP (ref 3.8–10.5)
WBC # FLD AUTO: 7.4 K/UL — SIGNIFICANT CHANGE UP (ref 3.8–10.5)
WBC UR QL: 38 /HPF — HIGH (ref 0–5)

## 2024-02-22 PROCEDURE — 84702 CHORIONIC GONADOTROPIN TEST: CPT

## 2024-02-22 PROCEDURE — 87086 URINE CULTURE/COLONY COUNT: CPT

## 2024-02-22 PROCEDURE — 99284 EMERGENCY DEPT VISIT MOD MDM: CPT

## 2024-02-22 PROCEDURE — 85025 COMPLETE CBC W/AUTO DIFF WBC: CPT

## 2024-02-22 PROCEDURE — 81001 URINALYSIS AUTO W/SCOPE: CPT

## 2024-02-22 PROCEDURE — 99283 EMERGENCY DEPT VISIT LOW MDM: CPT

## 2024-02-22 PROCEDURE — 80053 COMPREHEN METABOLIC PANEL: CPT

## 2024-02-22 PROCEDURE — 36415 COLL VENOUS BLD VENIPUNCTURE: CPT

## 2024-02-22 PROCEDURE — 83690 ASSAY OF LIPASE: CPT

## 2024-02-22 NOTE — ED ADULT NURSE NOTE - OBJECTIVE STATEMENT
23y F PMH gastritis, frequent UTis, H.pylori presents to the ED c/o abd pain. Pt reports intermittent abd pain x2wks. Pt describes the pain as 'period cramps'. Pt currently receiving lipo shots (fat burning), most recent yesterday. Pt received depo shots. LMP approx 6months ago. Pt states she see occasional blood when she has BM and when she wipes on the toilet paper. Denies lightheadedness, dizziness, cp, sob, nvd. Pt A&Ox4. Comfort and safety maintained.

## 2024-02-22 NOTE — ED ADULT NURSE NOTE - NSFALLUNIVINTERV_ED_ALL_ED
Bed/Stretcher in lowest position, wheels locked, appropriate side rails in place/Call bell, personal items and telephone in reach/Instruct patient to call for assistance before getting out of bed/chair/stretcher/Non-slip footwear applied when patient is off stretcher/[place] to call system/Physically safe environment - no spills, clutter or unnecessary equipment/Purposeful proactive rounding/Room/bathroom lighting operational, light cord in reach

## 2024-02-22 NOTE — ED PROVIDER NOTE - CLINICAL SUMMARY MEDICAL DECISION MAKING FREE TEXT BOX
Patient presents emergency department with complaints of rectal pain and concern for colon cancer.  Patient patient is hemodynamically stable afebrile presentation.  Patient physical exam unremarkable at this time.  Patient  exam with chaperone shows no acute signs of bleeding from the rectum, no signs of hemorrhoids no irritation.  Given patient presentation and history we will obtain lab work to evaluate for any acute pathologies.  Low clinical suspicion at this time for acute pathology.  Patient will be given GI follow-up.

## 2024-02-22 NOTE — ED PROVIDER NOTE - OBJECTIVE STATEMENT
Patient presents emergency department complaining of rectal pain and abdominal pain.  Patient states that she became alarmed over the last few days when she had rectal bleeding.  Patient states she had spots of blood in her poop.  Patient states every time she wipes she will see some blood on her toilet paper.  Denies any lightheadedness, dizziness, chest pain, shortness of breath.  Denies any fevers or chills or sick contacts.  Patient states she was googling her symptoms and was concerned that she had colon cancer.  Patient wanted to come into the emergency department to make sure she did not have colon cancer.  Patient has outpatient GI follow-up scheduled for next week but did not want to wait until then.

## 2024-02-22 NOTE — ED PROVIDER NOTE - PATIENT PORTAL LINK FT
You can access the FollowMyHealth Patient Portal offered by [facility] by registering at the following website: [email or web address]. By joining [employer]’s FollowMyHealth portal, you will also be able to view your health information using other applications (apps) compatible with our system.

## 2024-02-22 NOTE — ED PROVIDER NOTE - PHYSICAL EXAMINATION
Physical Exam:  Gen: NAD, AOx3, non-toxic appearing, able to ambulate without assistance  Head: NCAT  HEENT: EOMI, PEERLA, normal conjunctiva, tongue midline, oral mucosa moist  Lung: CTAB, no respiratory distress, no wheezes/rhonchi/rales B/L, speaking in full sentences  CV: RRR  Abd: soft, NT, ND, no guarding, no rigidity, no rebound tenderness, no CVA tenderness   MSK: no visible deformities, ROM normal in UE/LE, no back pain  Neuro: No focal sensory or motor deficits  Skin: Warm, well perfused, no rash, no leg swelling     exam with Dr. Kimura- No signs of bleeding from rectum, no hemorrhoids, no irritation.

## 2024-02-22 NOTE — ED PROVIDER NOTE - ATTENDING CONTRIBUTION TO CARE
Patient presents with rectal bleeding, intermittent  episodes of rectal pain, concerned about colon cancer due to her symptoms.  On exam she is very well-appearing, no active bleeding, no hemorrhoids, hemodynamically stable, soft nontender abdomen.  Labs unremarkable.  While etiology of her bleeding is unclear she does not appear to have any emergent pathology and is safe for discharge with GI follow-up for further evaluation if her symptoms persist.  Patient reassured that while we cannot fully rule out cancer it seems highly unlikely that that is the cause of her symptoms and that she is safe for further outpatient follow-up.

## 2024-02-22 NOTE — ED PROVIDER NOTE - NSFOLLOWUPINSTRUCTIONS_ED_ALL_ED_FT
Please follow up with your doctor within 1 week. Bring copies of your results with you (provided in your discharge paperwork). Please stay well-hydrated.    You may take 500-1000 mg acetaminophen (tylenol) every 6 hours, as needed for pain.  You may take 600 mg ibuprofen every 8 hours, with food, as needed for pain.  You can take tylenol and ibuprofen at the same time.     PLEASE RETURN TO ED FOR NEW OR WORSENING SYMPTOMS (intractable nausea/vomiting, severe bleeding, fever over 100.4F, loss of movement of one or more limbs, seizure)    Finally, please follow up with [your PCP/a specialist] regarding the incidental findings seen on your imaging (please see discharge paperwork for results).

## 2024-02-22 NOTE — ED PROVIDER NOTE - NSFOLLOWUPCLINICS_GEN_ALL_ED_FT
Gastroenterology at [facility]  Gastroenterology  [address] [place] [id number]  Phone: [phone]  Fax:   Follow Up Time: Routine

## 2024-02-23 LAB
CULTURE RESULTS: SIGNIFICANT CHANGE UP
SPECIMEN SOURCE: SIGNIFICANT CHANGE UP

## 2024-02-27 ENCOUNTER — APPOINTMENT (OUTPATIENT)
Dept: COLORECTAL SURGERY | Facility: CLINIC | Age: 24
End: 2024-02-27

## 2024-02-28 ENCOUNTER — APPOINTMENT (OUTPATIENT)
Dept: COLORECTAL SURGERY | Facility: CLINIC | Age: 24
End: 2024-02-28
Payer: MEDICAID

## 2024-02-28 VITALS
SYSTOLIC BLOOD PRESSURE: 101 MMHG | HEART RATE: 67 BPM | WEIGHT: 145 LBS | RESPIRATION RATE: 16 BRPM | DIASTOLIC BLOOD PRESSURE: 67 MMHG | OXYGEN SATURATION: 97 % | BODY MASS INDEX: 25.69 KG/M2 | HEIGHT: 63 IN | TEMPERATURE: 98.9 F

## 2024-02-28 DIAGNOSIS — F12.90 CANNABIS USE, UNSPECIFIED, UNCOMPLICATED: ICD-10-CM

## 2024-02-28 DIAGNOSIS — K62.5 HEMORRHAGE OF ANUS AND RECTUM: ICD-10-CM

## 2024-02-28 DIAGNOSIS — Z80.0 FAMILY HISTORY OF MALIGNANT NEOPLASM OF DIGESTIVE ORGANS: ICD-10-CM

## 2024-02-28 DIAGNOSIS — K59.00 CONSTIPATION, UNSPECIFIED: ICD-10-CM

## 2024-02-28 DIAGNOSIS — Z78.9 OTHER SPECIFIED HEALTH STATUS: ICD-10-CM

## 2024-02-28 DIAGNOSIS — L29.0 PRURITUS ANI: ICD-10-CM

## 2024-02-28 PROCEDURE — 99204 OFFICE O/P NEW MOD 45 MIN: CPT | Mod: 25

## 2024-02-28 PROCEDURE — 46600 DIAGNOSTIC ANOSCOPY SPX: CPT

## 2024-02-28 RX ORDER — CLOTRIMAZOLE AND BETAMETHASONE DIPROPIONATE 10; .5 MG/G; MG/G
1-0.05 CREAM TOPICAL TWICE DAILY
Qty: 1 | Refills: 3 | Status: ACTIVE | COMMUNITY
Start: 2024-02-28 | End: 1900-01-01

## 2024-02-28 NOTE — PHYSICAL EXAM
[Normal Breath Sounds] : Normal breath sounds [Normal Heart Sounds] : normal heart sounds [Normal Rate and Rhythm] : normal rate and rhythm [No Rash or Lesion] : No rash or lesion [Alert] : alert [Oriented to Person] : oriented to person [Oriented to Place] : oriented to place [Oriented to Time] : oriented to time [Calm] : calm [Abdomen Masses] : No abdominal masses [Abdomen Tenderness] : ~T No ~M abdominal tenderness [Fistula] : no fistulas [Wart] : no warts [Ulcer ___ cm] : no ulcers [Normal] : was normal [None] : there was no rectal mass  [Wheezing] : no wheezing was heard [de-identified] : Skin breakdown throughout the anal verge including a linear break in the skin extending up the gluteal cleft [de-identified] : Anoscopy reveals small internal hemorrhoids [de-identified] : healthy well nourished [de-identified] : NC/AT [de-identified] : supple [de-identified] : SARA, steady gait

## 2024-02-28 NOTE — ASSESSMENT
[FreeTextEntry1] : Will treat her skin breakdown with a trial of Lotrisone and Calmoseptine. She is encouraged to follow-up with her PCP for a referral to GI to help manage her abdominal complaints and her irregular bowel movements.  She will follow-up here in 2 to 3 weeks if her symptoms or not improved

## 2024-02-28 NOTE — HISTORY OF PRESENT ILLNESS
[FreeTextEntry1] : 24 y/o female with history of rectal bleeding and pain presents today for initial consultation.  She notes daily bowel movements with alternating consistency between formed/diarrhea.  She does note occasional hard stool which require pushing/straining which makes rectal bleeding worse.  She also notes some irritation of the perineum.  Her bleeding started a few years ago but was infrequent. Over the past 3 months the frequency has increased to once a week. She also has perirectal irritation and discomfort daily.   She had previous H.pylori gastritis which she recently completed treatment for, but she continues to complains of abdominal pain, cramping and nausea, denies vomiting.    She has never had a colonoscopy.

## 2024-03-05 ENCOUNTER — APPOINTMENT (OUTPATIENT)
Dept: GASTROENTEROLOGY | Facility: CLINIC | Age: 24
End: 2024-03-05

## 2024-06-01 ENCOUNTER — EMERGENCY (EMERGENCY)
Facility: HOSPITAL | Age: 24
LOS: 1 days | Discharge: HOME | End: 2024-06-01
Attending: STUDENT IN AN ORGANIZED HEALTH CARE EDUCATION/TRAINING PROGRAM
Payer: MEDICAID

## 2024-06-01 VITALS
HEART RATE: 68 BPM | HEIGHT: 63 IN | RESPIRATION RATE: 20 BRPM | DIASTOLIC BLOOD PRESSURE: 75 MMHG | OXYGEN SATURATION: 100 % | WEIGHT: 145.06 LBS | TEMPERATURE: 97 F | SYSTOLIC BLOOD PRESSURE: 115 MMHG

## 2024-06-01 LAB
ALBUMIN SERPL ELPH-MCNC: 4.4 G/DL — SIGNIFICANT CHANGE UP (ref 3.3–5)
ALP SERPL-CCNC: 82 U/L — SIGNIFICANT CHANGE UP (ref 40–120)
ALT FLD-CCNC: 10 U/L — SIGNIFICANT CHANGE UP (ref 10–45)
ANION GAP SERPL CALC-SCNC: 14 MMOL/L — SIGNIFICANT CHANGE UP (ref 5–17)
APPEARANCE UR: CLEAR — SIGNIFICANT CHANGE UP
AST SERPL-CCNC: 14 U/L — SIGNIFICANT CHANGE UP (ref 10–40)
BACTERIA # UR AUTO: NEGATIVE /HPF — SIGNIFICANT CHANGE UP
BASOPHILS # BLD AUTO: 0.04 K/UL — SIGNIFICANT CHANGE UP (ref 0–0.2)
BASOPHILS NFR BLD AUTO: 0.6 % — SIGNIFICANT CHANGE UP (ref 0–2)
BILIRUB SERPL-MCNC: 0.9 MG/DL — SIGNIFICANT CHANGE UP (ref 0.2–1.2)
BILIRUB UR-MCNC: NEGATIVE — SIGNIFICANT CHANGE UP
BUN SERPL-MCNC: 11 MG/DL — SIGNIFICANT CHANGE UP (ref 7–23)
CALCIUM SERPL-MCNC: 9.7 MG/DL — SIGNIFICANT CHANGE UP (ref 8.4–10.5)
CAST: 2 /LPF — SIGNIFICANT CHANGE UP (ref 0–4)
CHLORIDE SERPL-SCNC: 104 MMOL/L — SIGNIFICANT CHANGE UP (ref 96–108)
CO2 SERPL-SCNC: 22 MMOL/L — SIGNIFICANT CHANGE UP (ref 22–31)
COLOR SPEC: YELLOW — SIGNIFICANT CHANGE UP
CREAT SERPL-MCNC: 0.53 MG/DL — SIGNIFICANT CHANGE UP (ref 0.5–1.3)
DIFF PNL FLD: NEGATIVE — SIGNIFICANT CHANGE UP
EGFR: 135 ML/MIN/1.73M2 — SIGNIFICANT CHANGE UP
EOSINOPHIL # BLD AUTO: 0.07 K/UL — SIGNIFICANT CHANGE UP (ref 0–0.5)
EOSINOPHIL NFR BLD AUTO: 1.1 % — SIGNIFICANT CHANGE UP (ref 0–6)
GLUCOSE SERPL-MCNC: 101 MG/DL — HIGH (ref 70–99)
GLUCOSE UR QL: NEGATIVE MG/DL — SIGNIFICANT CHANGE UP
HCG SERPL-ACNC: <2 MIU/ML — SIGNIFICANT CHANGE UP
HCT VFR BLD CALC: 37.9 % — SIGNIFICANT CHANGE UP (ref 34.5–45)
HGB BLD-MCNC: 12.7 G/DL — SIGNIFICANT CHANGE UP (ref 11.5–15.5)
IMM GRANULOCYTES NFR BLD AUTO: 0.3 % — SIGNIFICANT CHANGE UP (ref 0–0.9)
KETONES UR-MCNC: 15 MG/DL
LEUKOCYTE ESTERASE UR-ACNC: NEGATIVE — SIGNIFICANT CHANGE UP
LIDOCAIN IGE QN: 23 U/L — SIGNIFICANT CHANGE UP (ref 7–60)
LYMPHOCYTES # BLD AUTO: 2.12 K/UL — SIGNIFICANT CHANGE UP (ref 1–3.3)
LYMPHOCYTES # BLD AUTO: 33.8 % — SIGNIFICANT CHANGE UP (ref 13–44)
MCHC RBC-ENTMCNC: 29.7 PG — SIGNIFICANT CHANGE UP (ref 27–34)
MCHC RBC-ENTMCNC: 33.5 GM/DL — SIGNIFICANT CHANGE UP (ref 32–36)
MCV RBC AUTO: 88.6 FL — SIGNIFICANT CHANGE UP (ref 80–100)
MONOCYTES # BLD AUTO: 0.55 K/UL — SIGNIFICANT CHANGE UP (ref 0–0.9)
MONOCYTES NFR BLD AUTO: 8.8 % — SIGNIFICANT CHANGE UP (ref 2–14)
NEUTROPHILS # BLD AUTO: 3.47 K/UL — SIGNIFICANT CHANGE UP (ref 1.8–7.4)
NEUTROPHILS NFR BLD AUTO: 55.4 % — SIGNIFICANT CHANGE UP (ref 43–77)
NITRITE UR-MCNC: NEGATIVE — SIGNIFICANT CHANGE UP
NRBC # BLD: 0 /100 WBCS — SIGNIFICANT CHANGE UP (ref 0–0)
PH UR: 5.5 — SIGNIFICANT CHANGE UP (ref 5–8)
PLATELET # BLD AUTO: 316 K/UL — SIGNIFICANT CHANGE UP (ref 150–400)
POTASSIUM SERPL-MCNC: 3.6 MMOL/L — SIGNIFICANT CHANGE UP (ref 3.5–5.3)
POTASSIUM SERPL-SCNC: 3.6 MMOL/L — SIGNIFICANT CHANGE UP (ref 3.5–5.3)
PROT SERPL-MCNC: 7.6 G/DL — SIGNIFICANT CHANGE UP (ref 6–8.3)
PROT UR-MCNC: SIGNIFICANT CHANGE UP MG/DL
RBC # BLD: 4.28 M/UL — SIGNIFICANT CHANGE UP (ref 3.8–5.2)
RBC # FLD: 13.5 % — SIGNIFICANT CHANGE UP (ref 10.3–14.5)
RBC CASTS # UR COMP ASSIST: 1 /HPF — SIGNIFICANT CHANGE UP (ref 0–4)
REVIEW: SIGNIFICANT CHANGE UP
SODIUM SERPL-SCNC: 140 MMOL/L — SIGNIFICANT CHANGE UP (ref 135–145)
SP GR SPEC: >1.03 — HIGH (ref 1–1.03)
SQUAMOUS # UR AUTO: 3 /HPF — SIGNIFICANT CHANGE UP (ref 0–5)
UROBILINOGEN FLD QL: 1 MG/DL — SIGNIFICANT CHANGE UP (ref 0.2–1)
WBC # BLD: 6.27 K/UL — SIGNIFICANT CHANGE UP (ref 3.8–10.5)
WBC # FLD AUTO: 6.27 K/UL — SIGNIFICANT CHANGE UP (ref 3.8–10.5)
WBC UR QL: 2 /HPF — SIGNIFICANT CHANGE UP (ref 0–5)

## 2024-06-01 PROCEDURE — 74177 CT ABD & PELVIS W/CONTRAST: CPT | Mod: 26,MC

## 2024-06-01 PROCEDURE — 85025 COMPLETE CBC W/AUTO DIFF WBC: CPT

## 2024-06-01 PROCEDURE — 84702 CHORIONIC GONADOTROPIN TEST: CPT

## 2024-06-01 PROCEDURE — 96375 TX/PRO/DX INJ NEW DRUG ADDON: CPT

## 2024-06-01 PROCEDURE — 99285 EMERGENCY DEPT VISIT HI MDM: CPT

## 2024-06-01 PROCEDURE — 87086 URINE CULTURE/COLONY COUNT: CPT

## 2024-06-01 PROCEDURE — 96374 THER/PROPH/DIAG INJ IV PUSH: CPT | Mod: XU

## 2024-06-01 PROCEDURE — 99284 EMERGENCY DEPT VISIT MOD MDM: CPT | Mod: 25

## 2024-06-01 PROCEDURE — 81001 URINALYSIS AUTO W/SCOPE: CPT

## 2024-06-01 PROCEDURE — 74177 CT ABD & PELVIS W/CONTRAST: CPT | Mod: MC

## 2024-06-01 PROCEDURE — 80053 COMPREHEN METABOLIC PANEL: CPT

## 2024-06-01 PROCEDURE — 83690 ASSAY OF LIPASE: CPT

## 2024-06-01 RX ORDER — ACETAMINOPHEN 500 MG
1000 TABLET ORAL ONCE
Refills: 0 | Status: COMPLETED | OUTPATIENT
Start: 2024-06-01 | End: 2024-06-01

## 2024-06-01 RX ORDER — SODIUM CHLORIDE 9 MG/ML
1000 INJECTION, SOLUTION INTRAVENOUS ONCE
Refills: 0 | Status: COMPLETED | OUTPATIENT
Start: 2024-06-01 | End: 2024-06-01

## 2024-06-01 RX ORDER — MORPHINE SULFATE 50 MG/1
4 CAPSULE, EXTENDED RELEASE ORAL ONCE
Refills: 0 | Status: DISCONTINUED | OUTPATIENT
Start: 2024-06-01 | End: 2024-06-01

## 2024-06-01 RX ADMIN — MORPHINE SULFATE 4 MILLIGRAM(S): 50 CAPSULE, EXTENDED RELEASE ORAL at 21:03

## 2024-06-01 RX ADMIN — SODIUM CHLORIDE 1000 MILLILITER(S): 9 INJECTION, SOLUTION INTRAVENOUS at 21:03

## 2024-06-01 RX ADMIN — Medication 400 MILLIGRAM(S): at 20:13

## 2024-06-01 NOTE — ED PROVIDER NOTE - PATIENT PORTAL LINK FT
You can access the FollowMyHealth Patient Portal offered by [facility] by registering at the following website: [email or web address]. By joining [employer]’s FollowMyHealth portal, you will also be able to view your health information using other applications (apps) compatible with our system.

## 2024-06-01 NOTE — ED PROVIDER NOTE - PHYSICAL EXAMINATION
Const: Awake, alert, no acute distress.  Well appearing.  Moving comfortably on stretcher.  Cardiac: Regular rate and regular rhythm. S1 S2 present.  Peripheral pulses 2+ and symmetric. No LE edema.  Resp: Speaking in full sentences. No evidence of respiratory distress.  Breath sounds clear to auscultation b/l. Normal chest excursion.   Abd: LLQ ttp, left and right cva ttp, suprapubic ttp.  Non-distended, no overlying skin changes.  Soft, no guarding, no rigidity, no rebound tenderness.  No palpable masses.  Normal bowel sounds in all 4 quadrants.  Skin: Normal coloration.  No rashes, abrasions or lacerations.  Neuro: Awake, alert & oriented x 3.  Moves all extremities spontaneously.  No focal deficits.
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
## 2024-06-01 NOTE — ED ADULT TRIAGE NOTE - CHIEF COMPLAINT QUOTE
Urinary frequency, dysuria and flank pain. Pt given abx outpatient 3 days ago however symptoms worsening. endorsing chills

## 2024-06-01 NOTE — ED PROVIDER NOTE - ATTENDING CONTRIBUTION TO CARE
I was the supervising attending. I have independently seen face-to-face and examined the patient. I have reviewed the history and physical and discussed the MDM with the resident, fellow, JEN and/or student. I agree with the assessment and plan as presented unless otherwise documented as follows:    23F denies PMH presenting w/ dysuria, frequency, urgency, sensation of incomplete emptying x3d. Was seen at urgent care 3d ago and given Rx for Keflex x4d. Now having lower abdominal and bilateral flank pain, so presenting to the ED. Endorses chills and nausea. Denies fevers, vomiting, chest pain, SOB, hematuria, diarrhea. Has been taking Keflex as prescribed. Appears well, AOx3, not in acute distress. Afebrile, vitals WNL. Exam w/ very mild B/L CVAT and diffuse abdominal tenderness, maximally suprapubic. Likely pyelonephritis, will send labs/UA to check for signs of incomplete treatment of infection/systemic illness. Will need escalation of antibiotics. Will reassess after pain medication. -Hailey García MD (Attending)

## 2024-06-01 NOTE — ED PROVIDER NOTE - NSFOLLOWUPINSTRUCTIONS_ED_ALL_ED_FT
It was a pleasure caring for you today!    You were seen in the ER today for abdominal pain. Please take Ciprofloxacin every 12 hours for 7 days.     Please follow up with your primary care doctor within 1 - 3 days. Call and let them know you were seen in the ER today.   Bring the results of your blood work and imaging with you to your appointment, if applicable.    For pain, please take acetaminophen 650 mg every 6 hours for pain. Additionally, you can also take ibuprofen 400 mg every 6-8 hours for pain.    Return to the ER for any worsening symptoms or concerns, including chest pain, shortness of breath, lightheadedness, weakness, or any other concerns.

## 2024-06-01 NOTE — ED PROVIDER NOTE - PROGRESS NOTE DETAILS
Patient reassessed after initial pain medication, complaining of continued pain. Will give morphine. Initial w/u with labs unremarkable, no significant leukocytosis or electrolyte abnormalities. UA clean but likely in context of active antibiotic use. Given continued pain with relatively unrevealing initial workup, will obtain CTAP to r/o alternate pathology e.g. kidney stone, appendicitis. -Hailey García MD (Attending)

## 2024-06-01 NOTE — ED ADULT NURSE NOTE - OBJECTIVE STATEMENT
23y female presents with urinary symptoms. Pt states she has been having burning urination for the past three weeks with worsening symptoms over the past few days which are bilateral flank pain radiating to the back. Pt states she was started on abx a few days ago which she has been taking as prescribed. Pt denies fever but endorsing chills and intermittent nausea.

## 2024-06-01 NOTE — ED PROVIDER NOTE - IV ALTEPLASE INCLUSION HIDDEN
HIPAA verified by two patient identifiers. Liz Conrad is a 49 y.o. female    Chief Complaint   Patient presents with    [facility]    GERD    Cholesterol Problem       Visit Vitals  BP (!) 152/92 (BP 1 Location: Left upper arm, BP Patient Position: Sitting, BP Cuff Size: Adult long)   Pulse 75   Temp 98.3 °F (36.8 °C) (Oral)   Resp 18   Ht 5' 2\" (1.575 m)   Wt 323 lb 6.4 oz (146.7 kg)   LMP 12/18/2018 (Within Weeks)   SpO2 99%   BMI 59.15 kg/m²       Pain Scale: 4/10  Pain Location: Generalized      Health Maintenance Due   Topic Date Due    Pneumococcal 0-64 years (1 - PCV) Never done    Shingles Vaccine (1 of 2) Never done    COVID-19 Vaccine (3 - Booster for Moderna series) 06/30/2021    Flu Vaccine (1) 08/01/2022    Breast Cancer Screen Mammogram  12/17/2022         Coordination of Care Questionnaire:  :   1) Have you been to an emergency room, urgent care, or hospitalized since your last visit? If yes, where when, and reason for visit? no       2. Have seen or consulted any other health care provider since your last visit? If yes, where when, and reason for visit? NO      Patient is accompanied by self I have received verbal consent from Liz Conrad to discuss any/all medical information while they are present in the room. show

## 2024-06-01 NOTE — ED PROVIDER NOTE - CLINICAL SUMMARY MEDICAL DECISION MAKING FREE TEXT BOX
23-year-old female with no significant past medical history presents to the ED with dysuria and left-sided flank pain for the past 3 days.  Patient endorses increased urinary frequency, urgency, flank pain, nausea, chills.  Patient recently visited urgent care and was prescribed 4 days of Keflex.  However patient says pain has increasingly worsened.  No fevers, chest pain, abdominal pain, vomiting.  Patient is not sexually active, LMP 2 weeks ago.  DDx includes UTI, Pyelonephritis, pregnancy.  Dispo pending labs, imaging and reassessment

## 2024-06-02 VITALS
OXYGEN SATURATION: 100 % | HEART RATE: 75 BPM | SYSTOLIC BLOOD PRESSURE: 115 MMHG | RESPIRATION RATE: 20 BRPM | TEMPERATURE: 98 F | DIASTOLIC BLOOD PRESSURE: 84 MMHG

## 2024-06-02 RX ORDER — CIPROFLOXACIN LACTATE 400MG/40ML
1 VIAL (ML) INTRAVENOUS
Qty: 14 | Refills: 0
Start: 2024-06-02 | End: 2024-06-08

## 2024-06-03 LAB
CULTURE RESULTS: SIGNIFICANT CHANGE UP
SPECIMEN SOURCE: SIGNIFICANT CHANGE UP

## 2024-06-04 NOTE — ED PEDIATRIC NURSE NOTE - NS ED NURSE RECORD ANOTHER VITAL SIGN
Physical Therapy Daily Treatment Note      Patient: Colette Whitlock   : 1963  Referring practitioner: No ref. provider found  Date of Initial Visit: Type: THERAPY  Noted: 3/27/2024  Today's Date: 2024  Patient seen for 21 sessions       Visit Diagnoses:    ICD-10-CM ICD-9-CM   1. Status post rotator cuff repair  Z98.890 V45.89   2. Right shoulder pain, unspecified chronicity  M25.511 719.41   3. Decreased right shoulder range of motion  M25.611 719.51   4. Weakness of right arm  R29.898 729.89       Subjective Evaluation    History of Present Illness    Subjective comment: Pt reports she is feeling very tired and fatigued from her weekend and her daughter's wedding. She reports that she saw her doctor and was hoping she would be cleared to return to work but she was not. Per patient, her doctor wants her to continued to work in therapy for improved AROM and to limit the amount she is lifting at home.       Objective   See Exercise, Manual, and Modality Logs for complete treatment.       Assessment & Plan       Assessment  Assessment details: Patient tolerates therapy well. Patient continues to be challenged by AROM. Continued  to work on patient's (R) shoulder range of motion with active assisted and active motion with various activities as outlined in the note. Patient required cues for proper form of active motion, specifically her hand position. Patient's symptoms improved with ice after the therapy session. Patient continues to require skilled services to improve ROM, strength, and function to return to PLOF and RTW without increase in symptoms.             Timed:         Manual Therapy:    13     mins  71096;     Therapeutic Exercise:    26     mins  17196;     Neuromuscular Doreen:        mins  38280;    Therapeutic Activity:     14     mins  06689;     Gait Training:           mins  49591;     Ultrasound:          mins  51441;    Ionto                                   mins   35050  Self Care                             mins   54941      Un-Timed:  Electrical Stimulation:         mins  39822 ( );  Dry Needling          mins self-pay  Traction          mins 53963  Canalith Repos         mins 41219    Timed Treatment:   53   mins   Total Treatment:     63   mins    Louise Toscano PT  [place] License: [id number]                     Yes

## 2024-07-09 NOTE — ED PROVIDER NOTE - PRINCIPAL DIAGNOSIS
Functionality Assessment/Goals Worksheet     On a scale of 0 (Does not Interfere) to 10 (Completely Interferes)     1.  Which number describes how during the past week pain has interfered with           the following:  A.  General Activity:  0  B.  Mood: 0  C.  Walking Ability:  0  D.  Normal Work (Includes both work outside the home and housework):  0  E.  Relations with Other People:   0  F.  Sleep:   0  G.  Enjoyment of Life:   0    2.  Patient Prefers to Take their Pain Medications:     []  On a regular basis   [x]  Only when necessary    []  Does not take pain medications    3.  What are the Patient's Goals/Expectations for Visiting Pain Management?     [x]  Learn about my pain    []  Receive Medication   []  Physical Therapy     []  Treat Depression   []  Receive Injections    []  Treat Sleep   []  Deal with Anxiety and Stress   []  Treat Opoid Dependence/Addiction   []  Other:   HPI:   Florecita Ibarra is a 53 y.o. female is here today for    Chief Complaint: Low back pain, Hip pain and SI pain        F/U Right SI  diagnostic injection with IV sedation under Fluoroscopy 5/1/2024 states didn't  notice any relief, she was already feeling better from the LESI  finally kicking in,        She states between having the  LESI and SI injection she finally received relief from the LESI. She wasn't using the walker.  Now  LESI wearing off. Lasted 14 weeks. Now using walker more.  Her  low back pain  radiates down into right SI right  thigh  hip leg  to  great toe.     She has had increased low back pain  for the past 2-3 weeks. She has had to miss work due to pain over past 2 weeks . She wanted me to fill out FMLA.  I discussed I only offer FMLA for  the day of our injections and the day after unless has a fracture or pending surgery to affected region.      Here without walker.   Her main pain complaint is her low lumbar  right SI pain  radiates into buttocks  hip thigh groin.    States using walker outside of home 
Functionality Assessment/Goals Worksheet     On a scale of 0 (Does not Interfere) to 10 (Completely Interferes)     1.  Which number describes how during the past week pain has interfered with           the following:  A.  General Activity:  10  B.  Mood: 10  C.  Walking Ability:  10  D.  Normal Work (Includes both work outside the home and housework):  10  E.  Relations with Other People:   10  F.  Sleep:   10  G.  Enjoyment of Life:   10    2.  Patient Prefers to Take their Pain Medications:     []  On a regular basis   []  Only when necessary    [x]  Does not take pain medications    3.  What are the Patient's Goals/Expectations for Visiting Pain Management?     [x]  Learn about my pain    []  Receive Medication   []  Physical Therapy     []  Treat Depression   [x]  Receive Injections    []  Treat Sleep   []  Deal with Anxiety and Stress   []  Treat Opoid Dependence/Addiction   []  Other:                                
Acute UTI

## 2024-12-25 PROBLEM — F10.90 ALCOHOL USE: Status: ACTIVE | Noted: 2024-02-28

## 2025-01-10 ENCOUNTER — EMERGENCY (EMERGENCY)
Facility: HOSPITAL | Age: 25
LOS: 1 days | Discharge: HOME | End: 2025-01-10
Attending: STUDENT IN AN ORGANIZED HEALTH CARE EDUCATION/TRAINING PROGRAM
Payer: MEDICAID

## 2025-01-10 VITALS
RESPIRATION RATE: 18 BRPM | DIASTOLIC BLOOD PRESSURE: 64 MMHG | OXYGEN SATURATION: 96 % | HEART RATE: 87 BPM | SYSTOLIC BLOOD PRESSURE: 100 MMHG | TEMPERATURE: 99 F

## 2025-01-10 VITALS
RESPIRATION RATE: 22 BRPM | DIASTOLIC BLOOD PRESSURE: 78 MMHG | HEART RATE: 105 BPM | WEIGHT: 145.06 LBS | TEMPERATURE: 98 F | HEIGHT: 63 IN | OXYGEN SATURATION: 98 % | SYSTOLIC BLOOD PRESSURE: 111 MMHG

## 2025-01-10 LAB
ALBUMIN SERPL ELPH-MCNC: 4.3 G/DL — SIGNIFICANT CHANGE UP (ref 3.3–5)
ALP SERPL-CCNC: 74 U/L — SIGNIFICANT CHANGE UP (ref 40–120)
ALT FLD-CCNC: 11 U/L — SIGNIFICANT CHANGE UP (ref 10–45)
ANION GAP SERPL CALC-SCNC: 16 MMOL/L — SIGNIFICANT CHANGE UP (ref 5–17)
APPEARANCE UR: CLEAR — SIGNIFICANT CHANGE UP
AST SERPL-CCNC: 12 U/L — SIGNIFICANT CHANGE UP (ref 10–40)
BACTERIA # UR AUTO: ABNORMAL /HPF
BASOPHILS # BLD AUTO: 0 K/UL — SIGNIFICANT CHANGE UP (ref 0–0.2)
BASOPHILS NFR BLD AUTO: 0 % — SIGNIFICANT CHANGE UP (ref 0–2)
BILIRUB SERPL-MCNC: 1.1 MG/DL — SIGNIFICANT CHANGE UP (ref 0.2–1.2)
BILIRUB UR-MCNC: NEGATIVE — SIGNIFICANT CHANGE UP
BUN SERPL-MCNC: 11 MG/DL — SIGNIFICANT CHANGE UP (ref 7–23)
CALCIUM SERPL-MCNC: 9.3 MG/DL — SIGNIFICANT CHANGE UP (ref 8.4–10.5)
CAST: 1 /LPF — SIGNIFICANT CHANGE UP (ref 0–4)
CHLORIDE SERPL-SCNC: 103 MMOL/L — SIGNIFICANT CHANGE UP (ref 96–108)
CO2 SERPL-SCNC: 19 MMOL/L — LOW (ref 22–31)
COLOR SPEC: YELLOW — SIGNIFICANT CHANGE UP
CREAT SERPL-MCNC: 0.57 MG/DL — SIGNIFICANT CHANGE UP (ref 0.5–1.3)
DIFF PNL FLD: ABNORMAL
EGFR: 132 ML/MIN/1.73M2 — SIGNIFICANT CHANGE UP
EOSINOPHIL # BLD AUTO: 0 K/UL — SIGNIFICANT CHANGE UP (ref 0–0.5)
EOSINOPHIL NFR BLD AUTO: 0 % — SIGNIFICANT CHANGE UP (ref 0–6)
GLUCOSE SERPL-MCNC: 118 MG/DL — HIGH (ref 70–99)
GLUCOSE UR QL: NEGATIVE MG/DL — SIGNIFICANT CHANGE UP
HCG SERPL-ACNC: <2 MIU/ML — SIGNIFICANT CHANGE UP
HCT VFR BLD CALC: 38.3 % — SIGNIFICANT CHANGE UP (ref 34.5–45)
HGB BLD-MCNC: 12.3 G/DL — SIGNIFICANT CHANGE UP (ref 11.5–15.5)
KETONES UR-MCNC: 40 MG/DL
LEUKOCYTE ESTERASE UR-ACNC: NEGATIVE — SIGNIFICANT CHANGE UP
LIDOCAIN IGE QN: 20 U/L — SIGNIFICANT CHANGE UP (ref 7–60)
LYMPHOCYTES # BLD AUTO: 0.33 K/UL — LOW (ref 1–3.3)
LYMPHOCYTES # BLD AUTO: 2.6 % — LOW (ref 13–44)
MANUAL SMEAR VERIFICATION: SIGNIFICANT CHANGE UP
MCHC RBC-ENTMCNC: 29 PG — SIGNIFICANT CHANGE UP (ref 27–34)
MCHC RBC-ENTMCNC: 32.1 G/DL — SIGNIFICANT CHANGE UP (ref 32–36)
MCV RBC AUTO: 90.3 FL — SIGNIFICANT CHANGE UP (ref 80–100)
MONOCYTES # BLD AUTO: 0.11 K/UL — SIGNIFICANT CHANGE UP (ref 0–0.9)
MONOCYTES NFR BLD AUTO: 0.9 % — LOW (ref 2–14)
NEUTROPHILS # BLD AUTO: 12.11 K/UL — HIGH (ref 1.8–7.4)
NEUTROPHILS NFR BLD AUTO: 96.5 % — HIGH (ref 43–77)
NITRITE UR-MCNC: NEGATIVE — SIGNIFICANT CHANGE UP
PH UR: 5.5 — SIGNIFICANT CHANGE UP (ref 5–8)
PLAT MORPH BLD: NORMAL — SIGNIFICANT CHANGE UP
PLATELET # BLD AUTO: 280 K/UL — SIGNIFICANT CHANGE UP (ref 150–400)
POTASSIUM SERPL-MCNC: 4 MMOL/L — SIGNIFICANT CHANGE UP (ref 3.5–5.3)
POTASSIUM SERPL-SCNC: 4 MMOL/L — SIGNIFICANT CHANGE UP (ref 3.5–5.3)
PROT SERPL-MCNC: 7.8 G/DL — SIGNIFICANT CHANGE UP (ref 6–8.3)
PROT UR-MCNC: NEGATIVE MG/DL — SIGNIFICANT CHANGE UP
RBC # BLD: 4.24 M/UL — SIGNIFICANT CHANGE UP (ref 3.8–5.2)
RBC # FLD: 13.6 % — SIGNIFICANT CHANGE UP (ref 10.3–14.5)
RBC BLD AUTO: NORMAL — SIGNIFICANT CHANGE UP
RBC CASTS # UR COMP ASSIST: 2 /HPF — SIGNIFICANT CHANGE UP (ref 0–4)
RENAL EPI CELLS # UR COMP ASSIST: PRESENT
REVIEW: SIGNIFICANT CHANGE UP
SODIUM SERPL-SCNC: 138 MMOL/L — SIGNIFICANT CHANGE UP (ref 135–145)
SP GR SPEC: 1.03 — SIGNIFICANT CHANGE UP (ref 1–1.03)
SQUAMOUS # UR AUTO: 18 /HPF — HIGH (ref 0–5)
UROBILINOGEN FLD QL: 1 MG/DL — SIGNIFICANT CHANGE UP (ref 0.2–1)
WBC # BLD: 12.55 K/UL — HIGH (ref 3.8–10.5)
WBC # FLD AUTO: 12.55 K/UL — HIGH (ref 3.8–10.5)
WBC UR QL: 2 /HPF — SIGNIFICANT CHANGE UP (ref 0–5)

## 2025-01-10 PROCEDURE — 96374 THER/PROPH/DIAG INJ IV PUSH: CPT | Mod: XU

## 2025-01-10 PROCEDURE — 96375 TX/PRO/DX INJ NEW DRUG ADDON: CPT

## 2025-01-10 PROCEDURE — 96376 TX/PRO/DX INJ SAME DRUG ADON: CPT

## 2025-01-10 PROCEDURE — 99285 EMERGENCY DEPT VISIT HI MDM: CPT

## 2025-01-10 PROCEDURE — 76705 ECHO EXAM OF ABDOMEN: CPT | Mod: 26

## 2025-01-10 PROCEDURE — 81001 URINALYSIS AUTO W/SCOPE: CPT

## 2025-01-10 PROCEDURE — 80053 COMPREHEN METABOLIC PANEL: CPT

## 2025-01-10 PROCEDURE — 84702 CHORIONIC GONADOTROPIN TEST: CPT

## 2025-01-10 PROCEDURE — 74177 CT ABD & PELVIS W/CONTRAST: CPT | Mod: MC

## 2025-01-10 PROCEDURE — 99284 EMERGENCY DEPT VISIT MOD MDM: CPT | Mod: 25

## 2025-01-10 PROCEDURE — 83690 ASSAY OF LIPASE: CPT

## 2025-01-10 PROCEDURE — 74177 CT ABD & PELVIS W/CONTRAST: CPT | Mod: 26

## 2025-01-10 PROCEDURE — 85025 COMPLETE CBC W/AUTO DIFF WBC: CPT

## 2025-01-10 PROCEDURE — 87086 URINE CULTURE/COLONY COUNT: CPT

## 2025-01-10 PROCEDURE — 76705 ECHO EXAM OF ABDOMEN: CPT

## 2025-01-10 RX ORDER — ONDANSETRON 4 MG/1
1 TABLET ORAL
Qty: 1 | Refills: 0
Start: 2025-01-10 | End: 2025-01-12

## 2025-01-10 RX ORDER — ONDANSETRON 4 MG/1
4 TABLET ORAL ONCE
Refills: 0 | Status: COMPLETED | OUTPATIENT
Start: 2025-01-10 | End: 2025-01-10

## 2025-01-10 RX ORDER — ACETAMINOPHEN 80 MG/.8ML
1000 SOLUTION/ DROPS ORAL ONCE
Refills: 0 | Status: COMPLETED | OUTPATIENT
Start: 2025-01-10 | End: 2025-01-10

## 2025-01-10 RX ORDER — IBUPROFEN 200 MG
400 TABLET ORAL ONCE
Refills: 0 | Status: COMPLETED | OUTPATIENT
Start: 2025-01-10 | End: 2025-01-10

## 2025-01-10 RX ORDER — SODIUM CHLORIDE 9 MG/ML
1000 INJECTION, SOLUTION INTRAMUSCULAR; INTRAVENOUS; SUBCUTANEOUS ONCE
Refills: 0 | Status: COMPLETED | OUTPATIENT
Start: 2025-01-10 | End: 2025-01-10

## 2025-01-10 RX ORDER — KETOROLAC TROMETHAMINE 30 MG/ML
15 INJECTION INTRAMUSCULAR; INTRAVENOUS ONCE
Refills: 0 | Status: DISCONTINUED | OUTPATIENT
Start: 2025-01-10 | End: 2025-01-10

## 2025-01-10 RX ADMIN — KETOROLAC TROMETHAMINE 15 MILLIGRAM(S): 30 INJECTION INTRAMUSCULAR; INTRAVENOUS at 04:30

## 2025-01-10 RX ADMIN — ACETAMINOPHEN 400 MILLIGRAM(S): 80 SOLUTION/ DROPS ORAL at 07:26

## 2025-01-10 RX ADMIN — Medication 400 MILLIGRAM(S): at 10:25

## 2025-01-10 RX ADMIN — ONDANSETRON 4 MILLIGRAM(S): 4 TABLET ORAL at 08:35

## 2025-01-10 RX ADMIN — SODIUM CHLORIDE 1000 MILLILITER(S): 9 INJECTION, SOLUTION INTRAMUSCULAR; INTRAVENOUS; SUBCUTANEOUS at 04:15

## 2025-01-10 RX ADMIN — ONDANSETRON 4 MILLIGRAM(S): 4 TABLET ORAL at 04:14

## 2025-01-10 NOTE — ED PROVIDER NOTE - ATTENDING CONTRIBUTION TO CARE
Jonh Salinas DO: I have personally performed a face to face medical and diagnostic evaluation of the patient. I have discussed with and reviewed the Resident's and/or ACP's and/or Medical/PA/NP student's note and agree with the History, ROS, Physical Exam and MDM unless otherwise indicated. A brief summary of my personal evaluation and impression can be found below.    HPI above    CONSTITUTIONAL: Uncomfortable appearing  SKIN: Warm dry, normal skin turgor  HEAD: NCAT  EYES: EOMI, PERRLA, no scleral icterus, conjunctiva pink  NECK: Supple; non tender. Full ROM.  CARD: RRR  RESP: No respiratory distress  ABD: non-distended, no abdominal tenderness  MSK: Abdominal pain in epigastrium and right upper quadrant, slight periumbilical tenderness no lower abdominal tenderness no suprapubic or adnexal tenderness.  PSYCH: Cooperative, appropriate.     Patient has upper abdominal pain and appears uncomfortable suspect biliary colic versus gastritis versus pancreatitis, will give supportive care, give right upper quadrant ultrasound, CT abdomen pelvis.  No concern for lower abdominal or gynecologic cause of patient's symptoms at this time.  Dispo pending CT results response to supportive care.  Low concern for appendicitis will evaluate on CT, anticipate patient discharge home

## 2025-01-10 NOTE — ED ADULT NURSE NOTE - NSFALLRISKINTERV_ED_ALL_ED
Assistance OOB with selected safe patient handling equipment if applicable/Assistance with ambulation/Communicate fall risk and risk factors to all staff, patient, and family/Provide visual cue: yellow wristband, yellow gown, etc/Reinforce activity limits and safety measures with patient and family/Bed in lowest position, wheels locked, appropriate side rails in place/Call bell, personal items and telephone in reach/Instruct patient to call for assistance before getting out of bed/chair/stretcher/Physically safe environment - no spills, clutter or unnecessary equipment/Purposeful Proactive Rounding/Room/bathroom lighting operational, light cord in reach

## 2025-01-10 NOTE — ED PROVIDER NOTE - PROGRESS NOTE DETAILS
Dr. Bailey: Received signout from .  24-year-old female presenting with right upper and right lower quadrant abdominal pain.  Also complains of nausea vomiting.  No fevers or chills.  States feels better now.  Ultrasound results reviewed, noted to have a gallbladder polyp versus adherent sludge ball.  On exam patient is well-appearing, no acute distress, minimal right upper quadrant tenderness to palpation, negative Azevedo's.  Awaiting CT, will p.o. challenge after CT results and give patient outpatient surgery and GI follow-up.  Patient is agreeable with the plan. Amandeep Cedeno MD, PGY1:  Pt received at signout 7am, pt reassessed, states pain resolved, pt feel better but requesting nausea medication. On exam nontender abdomen, negative Azevedo's sign, well appearing. Amandeep Cedeno MD, PGY1:  Pt tolerating PO and feels comfortable going home. Informed about CT and US results. Pt states she has a GI doctor she sees and will f/u with them. Will give gen surg recs.

## 2025-01-10 NOTE — ED PROVIDER NOTE - PHYSICAL EXAMINATION
Gen: AAOx3, uncomfortable  HEENT: NCAT. PEERLA, EOMI, oral mucosa moist, normal conjunctiva  Lung: CTAB, no respiratory distress, no wheezes/rhonchi/rales B/L, speaking in full sentences  CV: RRR, no murmurs, rubs or gallops  Abd: soft, TTP over RUQ, LUQ, epigastrum, no guarding, no CVA tenderness  MSK: no visible deformities  Neuro: No focal sensory or motor deficits  Skin: Warm, well perfused, no rash  Psych: normal affect.

## 2025-01-10 NOTE — ED PROVIDER NOTE - CLINICAL SUMMARY MEDICAL DECISION MAKING FREE TEXT BOX
24F, PMHx gastritis, H. pylori presents to ED for abdominal pain, nausea, vomiting.  Patient states that abdominal pain, nausea, vomiting began acutely around 4 PM, 6 episodes of initially NB/NB emesis with food products, progressing to bilious emesis over the past several hours.  Patient states that she is currently being treated for UTI and yeast infection, was unable to take antibiotics today due to inability to tolerate p.o.  Patient states that brother recently had very similar symptoms, which resolved after a few days.  Denies fevers, coffee-ground emesis, endorses chest pain radiating from epigastrium, denies worsening urinary symptoms, diarrhea, constipation.  Denies shortness of breath, double/blurry vision, numbness/tingling.  Patient denies eating new foods, foul tasting foods.    Vitals:  /78  Resp 22 unlabored    Temp 98  SpO2 98% room air    Patient uncomfortable appearing, however abdominal exam is largely benign.  Low concern for acute aortic pathology, acute mesenteric ischemia given reassuring physical exam.  Likely viral gastroenteritis versus toxigenic gastroenteritis in setting of recent sick contacts.  However, also considered are pancreatitis and cholecystitis.  Given physical exam, no guarding, and recent sick contacts, will defer right upper quadrant ultrasound.  If no clinical improvement, will pursue further imaging.  Will acquire labs, UA, symptomatic control with antiemetics, NSAIDs.  Dispo per workup and reassessment.

## 2025-01-10 NOTE — ED PROVIDER NOTE - PATIENT PORTAL LINK FT
You can access the FollowMyHealth Patient Portal offered by [facility] by registering at the following website: [email or web address]. By joining [employer]’s FollowMyHealth portal, you will also be able to view your health information using other applications (apps) compatible with our system.

## 2025-01-10 NOTE — ED ADULT NURSE NOTE - OBJECTIVE STATEMENT
25 y/o F A&Ox4 with PMH of UTI presents to the ED c/o abdominal pain. Pt reports severe middle abdominal pain, nausea and vomiting since 3 pm yesterday. Pt endorses chills and body aches. Pt reports she was recently diagnosed with a UTI and has been taking PO antibiotics. Denies fevers. IV access established; 20 G L AC. Patient safety maintained, bed is in lowest position, wheels locked, and side rails raised.

## 2025-01-10 NOTE — ED ADULT NURSE NOTE - ED STAT RN HANDOFF WHERE
"Oncology Rooming Note    September 14, 2023 1:02 PM   Amelia Michel is a 64 year old adult who presents for:    No chief complaint on file.    Initial Vitals: /78   Pulse 94   Temp 98.5  F (36.9  C) (Oral)   Resp 16   Wt 49.6 kg (109 lb 4.8 oz)   SpO2 100%   BMI 22.63 kg/m   Estimated body mass index is 22.63 kg/m  as calculated from the following:    Height as of 8/8/23: 1.48 m (4' 10.27\").    Weight as of this encounter: 49.6 kg (109 lb 4.8 oz). Body surface area is 1.43 meters squared.  Moderate Pain (4) Comment: Data Unavailable   No LMP recorded. Patient is postmenopausal.  Allergies reviewed: Yes  Medications reviewed: Yes    Medications: Medication refills not needed today.  Pharmacy name entered into [facility]: [facility][id number] - [facility] [address] AT [facility] [address]    Clinical concerns: None       Comfort Hdz LPN  9/14/2023              "
Chief Complaint   Patient presents with    Blood Draw     Venipuncture labs drawn, checked into next appt     /78   Pulse 94   Temp 98.5  F (36.9  C) (Oral)   Resp 16   Wt 49.6 kg (109 lb 4.8 oz)   SpO2 100%   BMI 22.63 kg/m    Lionel Pisano RN on 9/14/2023 at 1:05 PM    
Pink

## 2025-01-10 NOTE — ED PROVIDER NOTE - NSFOLLOWUPCLINICS_GEN_ALL_ED_FT
[facility]  General Surgery  [address] [place] [address] - [address] [place] [address]  Phone: [phone]  Fax:

## 2025-01-10 NOTE — ED ADULT TRIAGE NOTE - CHIEF COMPLAINT QUOTE
c/o severe middle region abd pain started @1500, decreased PO intake, nausea and x5 episodes of emesis, along with chills/body aches

## 2025-01-10 NOTE — ED PROVIDER NOTE - NSICDXPASTMEDICALHX_GEN_ALL_CORE_FT
PAST MEDICAL HISTORY:  Gastritis     Gastritis, Helicobacter pylori     UTI (urinary tract infection)

## 2025-01-11 LAB
CULTURE RESULTS: SIGNIFICANT CHANGE UP
SPECIMEN SOURCE: SIGNIFICANT CHANGE UP

## 2025-02-25 NOTE — ED ADULT TRIAGE NOTE - HEART RATE (BEATS/MIN)
Problem: Adult Inpatient Plan of Care  Goal: Plan of Care Review  Outcome: Met  Goal: Patient-Specific Goal (Individualized)  Outcome: Met  Goal: Absence of Hospital-Acquired Illness or Injury  Outcome: Met  Goal: Optimal Comfort and Wellbeing  Outcome: Met  Goal: Readiness for Transition of Care  Outcome: Met      68

## 2025-03-08 ENCOUNTER — EMERGENCY (EMERGENCY)
Facility: HOSPITAL | Age: 25
LOS: 1 days | Discharge: HOME | End: 2025-03-08
Attending: STUDENT IN AN ORGANIZED HEALTH CARE EDUCATION/TRAINING PROGRAM
Payer: MEDICAID

## 2025-03-08 VITALS
SYSTOLIC BLOOD PRESSURE: 128 MMHG | TEMPERATURE: 98 F | HEIGHT: 63 IN | HEART RATE: 81 BPM | RESPIRATION RATE: 18 BRPM | OXYGEN SATURATION: 99 % | DIASTOLIC BLOOD PRESSURE: 78 MMHG | WEIGHT: 145.06 LBS

## 2025-03-08 LAB
ALBUMIN SERPL ELPH-MCNC: 4.5 G/DL — SIGNIFICANT CHANGE UP (ref 3.3–5)
ALP SERPL-CCNC: 84 U/L — SIGNIFICANT CHANGE UP (ref 40–120)
ALT FLD-CCNC: 11 U/L — SIGNIFICANT CHANGE UP (ref 10–45)
ANION GAP SERPL CALC-SCNC: 18 MMOL/L — HIGH (ref 5–17)
AST SERPL-CCNC: 13 U/L — SIGNIFICANT CHANGE UP (ref 10–40)
BASOPHILS # BLD AUTO: 0.04 K/UL — SIGNIFICANT CHANGE UP (ref 0–0.2)
BASOPHILS NFR BLD AUTO: 0.3 % — SIGNIFICANT CHANGE UP (ref 0–2)
BILIRUB SERPL-MCNC: 0.7 MG/DL — SIGNIFICANT CHANGE UP (ref 0.2–1.2)
BUN SERPL-MCNC: 6 MG/DL — LOW (ref 7–23)
CALCIUM SERPL-MCNC: 9.5 MG/DL — SIGNIFICANT CHANGE UP (ref 8.4–10.5)
CHLORIDE SERPL-SCNC: 102 MMOL/L — SIGNIFICANT CHANGE UP (ref 96–108)
CO2 SERPL-SCNC: 21 MMOL/L — LOW (ref 22–31)
CREAT SERPL-MCNC: 0.54 MG/DL — SIGNIFICANT CHANGE UP (ref 0.5–1.3)
EGFR: 133 ML/MIN/1.73M2 — SIGNIFICANT CHANGE UP
EOSINOPHIL # BLD AUTO: 0.01 K/UL — SIGNIFICANT CHANGE UP (ref 0–0.5)
EOSINOPHIL NFR BLD AUTO: 0.1 % — SIGNIFICANT CHANGE UP (ref 0–6)
GAS PNL BLDV: SIGNIFICANT CHANGE UP
GLUCOSE SERPL-MCNC: 101 MG/DL — HIGH (ref 70–99)
HCG SERPL-ACNC: <2 MIU/ML — SIGNIFICANT CHANGE UP
HCT VFR BLD CALC: 37 % — SIGNIFICANT CHANGE UP (ref 34.5–45)
HGB BLD-MCNC: 12.4 G/DL — SIGNIFICANT CHANGE UP (ref 11.5–15.5)
IMM GRANULOCYTES NFR BLD AUTO: 0.3 % — SIGNIFICANT CHANGE UP (ref 0–0.9)
LYMPHOCYTES # BLD AUTO: 1.17 K/UL — SIGNIFICANT CHANGE UP (ref 1–3.3)
LYMPHOCYTES # BLD AUTO: 9.4 % — LOW (ref 13–44)
MCHC RBC-ENTMCNC: 30.6 PG — SIGNIFICANT CHANGE UP (ref 27–34)
MCHC RBC-ENTMCNC: 33.5 G/DL — SIGNIFICANT CHANGE UP (ref 32–36)
MCV RBC AUTO: 91.4 FL — SIGNIFICANT CHANGE UP (ref 80–100)
MONOCYTES # BLD AUTO: 0.5 K/UL — SIGNIFICANT CHANGE UP (ref 0–0.9)
MONOCYTES NFR BLD AUTO: 4 % — SIGNIFICANT CHANGE UP (ref 2–14)
NEUTROPHILS # BLD AUTO: 10.66 K/UL — HIGH (ref 1.8–7.4)
NEUTROPHILS NFR BLD AUTO: 85.9 % — HIGH (ref 43–77)
NRBC BLD AUTO-RTO: 0 /100 WBCS — SIGNIFICANT CHANGE UP (ref 0–0)
PLATELET # BLD AUTO: 376 K/UL — SIGNIFICANT CHANGE UP (ref 150–400)
POTASSIUM SERPL-MCNC: 3.7 MMOL/L — SIGNIFICANT CHANGE UP (ref 3.5–5.3)
POTASSIUM SERPL-SCNC: 3.7 MMOL/L — SIGNIFICANT CHANGE UP (ref 3.5–5.3)
PROT SERPL-MCNC: 8 G/DL — SIGNIFICANT CHANGE UP (ref 6–8.3)
RBC # BLD: 4.05 M/UL — SIGNIFICANT CHANGE UP (ref 3.8–5.2)
RBC # FLD: 13.9 % — SIGNIFICANT CHANGE UP (ref 10.3–14.5)
SODIUM SERPL-SCNC: 141 MMOL/L — SIGNIFICANT CHANGE UP (ref 135–145)
TROPONIN T, HIGH SENSITIVITY RESULT: <6 NG/L — SIGNIFICANT CHANGE UP (ref 0–51)
WBC # BLD: 12.42 K/UL — HIGH (ref 3.8–10.5)
WBC # FLD AUTO: 12.42 K/UL — HIGH (ref 3.8–10.5)

## 2025-03-08 PROCEDURE — 99285 EMERGENCY DEPT VISIT HI MDM: CPT

## 2025-03-08 PROCEDURE — 71045 X-RAY EXAM CHEST 1 VIEW: CPT | Mod: 26

## 2025-03-08 RX ORDER — ONDANSETRON HCL/PF 4 MG/2 ML
4 VIAL (ML) INJECTION ONCE
Refills: 0 | Status: COMPLETED | OUTPATIENT
Start: 2025-03-08 | End: 2025-03-08

## 2025-03-08 RX ADMIN — Medication 4 MILLIGRAM(S): at 22:50

## 2025-03-08 RX ADMIN — Medication 20 MILLIGRAM(S): at 22:50

## 2025-03-08 RX ADMIN — Medication 1000 MILLILITER(S): at 22:50

## 2025-03-08 NOTE — ED ADULT NURSE NOTE - OBJECTIVE STATEMENT
24yoF denies PMH presents to ED with chest pain, N/V, SOB. Patient reports episode during driving today when suddenly began feeling chest tightness, nausea, nonbloody vomitting, shortness of breath, anxiety. Episode self resolved. Patient reports similar symptoms occuring throughout past two weeks. Patient reports having difficulty with anxiety but has not had any diagnosis, does not see psychiatrist. Patient uses cannabis daily but quit two days ago, states that cannabis normally helps calm her down. Recently completed doxycline 1 week ago for UTI with increased frequency of urination. Denies fevers, chills, urinary symptoms, falls, injuries, recent travel, sedentary periods.

## 2025-03-08 NOTE — ED ADULT NURSE NOTE - CHIEF COMPLAINT QUOTE
SOB "panting like a dog", N/V/D, Throat dry, tongue numb, Hot flashes, Chills, "feels like throat is closing", "stabbing chest pain"  for a few weeks moving back and forth across chest, "possibly anxious" seen at ED in [facility] yesterday.

## 2025-03-08 NOTE — ED ADULT NURSE NOTE - CAS TRG GENERAL NORM CIRC DET
2nd REMINDER: An FMLA/Disability form was recently sent to your office for review and signature.    Please complete, sign and fax to [phone]  as soon as possible.      Thank you,   Forms Completion Team.      
Addended by: AVANI KOCH on: 8/21/2019 04:10 PM     Modules accepted: Orders    
Strong peripheral pulses

## 2025-03-08 NOTE — ED PROVIDER NOTE - PHYSICAL EXAMINATION
T(C): 36.5 (03-08-25 @ 20:51), Max: 36.5 (03-08-25 @ 20:51)  HR: 81 (03-08-25 @ 20:51) (81 - 81)  BP: 128/78 (03-08-25 @ 20:51) (128/78 - 128/78)  RR: 18 (03-08-25 @ 20:51) (18 - 18)  SpO2: 99% (03-08-25 @ 20:51) (99% - 99%)    CONSTITUTIONAL: Anxious, WNWD, Well groomed  EYES: PERRL, EOMI, No conjunctival or scleral injection, non-icteric  ENMT: MMM. Normal dentition; no pharyngeal injection or exudates  NECK: Supple  RESP: No respiratory distress, no use of accessory muscles; CTA b/l, no W/R/R  CV: Irregularly regular rhythm, no M/R/G  GI: Soft, NT, ND, no rebound, no guarding  MSK: No edema   SKIN: No rashes or ulcers noted  NEURO: CN II-XII grossly intact; no focal deficits  PSYCH: A&O x 3, Anxious affect

## 2025-03-08 NOTE — ED PROVIDER NOTE - PATIENT PORTAL LINK FT
You can access the FollowMyHealth Patient Portal offered by [facility] by registering at the following website: [email or web address]. By joining [email or web address]’s FollowMyHealth portal, you will also be able to view your health information using other applications (apps) compatible with our system.

## 2025-03-08 NOTE — ED ADULT TRIAGE NOTE - ESI TRIAGE ACUITY LEVEL, MLM
Negative concentration issues; Negative chest pain; Negative shortness of breath; Negative significant joint pain at night; Negative significant muscle pain at night; Negative rashes or itching; Negative heartburn; Negative significant mood issues    Vitals reported by patient        No data to display               Calculated BMI 34    Physical Exam completed by visual and auditory observation of patient with verbal input from patient.    General:   Alert, oriented, not in acute distress   Eyes:  Anicteric Sclerae; no obvious strabismus   Nose:  No obvious nasal septum deviation    Neck:   Midline trachea, no visible mass   Chest/Lungs:  Respiratory effort normal, no visualized signs of difficulty breathing or respiratory distress   CVS:  No JVD   Extremities:  No obvious rashes noted on face, neck, or hands   Neuro:  No facial asymmetry, no focal deficits; no obvious tremor    Psych:  Normal affect,  normal countenance     Tricia Wells is being evaluated by a Virtual Visit (video visit) encounter to address concerns as mentioned above.  A caregiver was present when appropriate. Due to this being a TeleHealth encounter (During COVID-19 public health emergency), evaluation of the following organ systems was limited: Vitals/Constitutional/EENT/Resp/CV/GI//MS/Neuro/Skin/Heme-Lymph-Imm.  Pursuant to the emergency declaration under the [place] Act and the National Emergencies Act, 1135 waiver authority and the Coronavirus Preparedness and Response Supplemental Appropriations Act, this Virtual Visit was conducted with patient's (and/or legal guardian's) consent, to reduce the patient's risk of exposure to COVID-19 and provide necessary medical care. The patient (or guardian if applicable) is aware that this is a billable service, which includes applicable copays.     Patient identification was verified at the start of the visit: Yes using name and date of birth. Patient's phone number [phone] (home)  was  3

## 2025-03-08 NOTE — ED ADULT NURSE NOTE - NS_ED_NURSE_TEACHING_TOPIC_ED_A_ED
Anxiety, Psych followup/Medications Inflammation Suggestive Of Cancer Camouflage Histology Text: There was a dense lymphocytic infiltrate which prevented adequate histologic evaluation of adjacent structures.

## 2025-03-08 NOTE — ED PROVIDER NOTE - ATTENDING CONTRIBUTION TO CARE
24 year old female with no medical history, presents to the ED with sudden onset substernal, non radiating, non pleuritic chest pain with associated nausea and vomiting. Also endorsing associated b/l hand tingling and tongue tingling during chest pain episode. symptoms have started to resolve. had similar symptoms yesterday in which she was evaluated at outside hospital with negative workup. Admits to heavy marijuana use, but none in last 2 days. Denies fever, chills, abdominal pain, diarrhea, leg swelling. no recent travel or surgeries.    Physical Exam:  Gen: NAD, awake and alert, non-toxic appearing  HEENT: Atraumatic, oropharynx clear, moist mucous membranes, normal conjunctiva  Cardio: RRR, no murmurs, rubs or gallops. no reproducible CWT  Lung: CTAB, no respiratory distress, no wheezes/rhonchi/rales B/L  Abd: soft, NT, ND, no guarding, no rigidity, no rebound tenderness  MSK: no visible deformities, ROMx4   Neuro: No focal sensory or motor deficits  Skin: Warm, well perfused, no rash, no leg swelling     Patient presents with chest pain. Minimal CAD risk factors (including age). Exam without evidence of volume overload. EKG without signs of active ischemia. HEART score: <3. Given the timing of pain to ER presentation, plan to send single troponin to evaluate for NSTEMI. Less likely PE (satting 100% on RA, no calf pian or swelling), aortic dissection, PTX given presentation.  CBC, CMP, troponin, EKG, CXR  Will reassess the need for additional interventions as clinically warranted. Refer to any progress notes for updates on clinical course and as a continuation of this MDM.

## 2025-03-08 NOTE — ED ADULT TRIAGE NOTE - CHIEF COMPLAINT QUOTE
SOB "panting like a dog", N/V/D, Throat dry, tongue numb, Hot flashes, Chills, "feels like throat is closing", "stabbing chest pain"  for a few weeks moving back and forth across chest, "possibly anxious" seen at ED in [facility] yesterday.

## 2025-03-08 NOTE — ED PROVIDER NOTE - NSFOLLOWUPINSTRUCTIONS_ED_ALL_ED_FT
You are seen in the emergency department for palpitations and feelings of anxiety.    Your workup did not reveal any acute abnormalities.    Please follow-up with your primary care doctor as well as our crisis center for further care.    Crisis Center at [facility]:  [address] [place] [address] and [address] [place] [address]  [phone]

## 2025-03-08 NOTE — ED PROVIDER NOTE - PROGRESS NOTE DETAILS
Attending Dr. Vegas: Patient reevaluated at the time of discharge.  We talked extensively regarding her issues with anxiety and these episodes which seem to be related to panic attacks.  We went through breathing techniques and discussion of our crisis center for further care.

## 2025-03-08 NOTE — ED PROVIDER NOTE - OBJECTIVE STATEMENT
Ms Edith Delgado is a 24 year old female with no medical history who presents with chest pain, nausea, and vomiting.  She reports that she has been having diffuse, bilateral stabbing chest pain for the past two weeks which is worse with deep inspiration.  Today she reports that she was driving when she experienced chest pain.  Reported that she got short of breath, felt that her tongue and mouth got numb, and began to feel pre-syncopal (has fainted in the past).  She decided to come in for these symptoms.  She has since been nauseated with vomiting.  Also feels like she is having increased work of breathing    She smokes marijuana heavily but has not smoked for around 2 days.  Trying to quit.  Does not use any other drugs.    She reports non-diagnosed anxiety and these symptoms are greatly exacerbating her anxiety.    She also reports that she may have an infected nipple piercing.  She was recently taking doxycycline for ureaplasma but stopped when she had these symptoms.

## 2025-03-08 NOTE — ED ADULT TRIAGE NOTE - MEANS OF ARRIVAL
Consultation - Radiation Oncology      GXR:[id number] : 1949  Encounter: [id number]  Patient Information: Therese Vega      CHIEF COMPLAINT  Chief Complaint   Patient presents with   • Brain Tumor     Cancer Staging   Malignant neoplasm of upper lobe of right lung [facility])  Staging form: Lung, AJCC 8th Edition  - Clinical stage from 2021: Stage IIIC (cT3, cN3, cM0) - Signed by Maria Ines Galvez MD on 2021  Histopathologic type: Adenocarcinoma, NOS           History of Present Illness     Therese Vega 1949 is a 72 y o  male Radiation Oncology consult for newly diagnosed brain metastasis (1 2 cm lesion in left precentral gyrus), referred by Larayne Gowers, hospitalist for [facility]     72year old male with history of adenocarcinoma NSCLC of right upper lobe diagnosed in 2021  Previous radiation under the care of Dr Faustino Smith, last rad onc follow-up on 22 with stable CT in 2022  He completed chemotherapy for cytoreduction followed by concurrent chemoRT completed 22  He continued with systemic immunotherapy with Keytruda       2/3/23 Med Onc, Dr Rajan Jung  Pt is tolerating single agent Keytruda quite well  Plan to order repeat imaging in 2 months  The patient's only complaint was that he feels he cannot hold things with his right hand the way he could previously   This started on Monday  Lois Kelley initially thought it was temporary but it has persisted   Pt sent to the ER for MRI as concerned for a stroke or metastatic disease to the brain         2/3 - 23 Hospital admission  He was found to have a 1 2 cm metastatic lesion in the left precentral gyrus with marked surrounding edema  Right hand weakness and  strength improved with Decadron  He was reviewed by neurosurgery and radiation-oncology with plan for [facility] as an outpatient   He was also started on Keppra          2/3/23 CTA head and neck w wo contrast  1   No acute intracranial CT abnormality    2   A 1 cm posterior left frontal lobe gray-white matter junction lesion with subjacent vasogenic edema most concerning for metastasis   No significant mass effect or midline shift   Recommend further assessment with brain MRI without and with contrast    3   Patent major vasculature of the [place] without high-grade stenosis   No aneurysm      4   No hemodynamically significant stenosis or dissection of cervical carotid and vertebral arteries    Left greater than right atherosclerotic change of cervical carotid arteries    5   No significant change in size of known right nasopharyngeal mass compared to neck CT 12/20/2021    6   Redemonstrated posttreatment change and partially obscure known right upper lobe mass in partially imaged upper chest   See the report of concurrent CTA chest/abdomen/pelvis         2/3/23 CTA chest abd pelvis w wo contrast  No evidence of thoracic aortic dissection   No pulmonary embolism in the main pulmonary arteries   Chronic loculated left effusion which demonstrates enlarging nodular densities within it, suggest pleural metastatic disease   Post treatment changes in the right lung with chronic consolidation in the right paramediastinal location with the air bronchogram and volume loss, as seen on the previous study  2/4/23 MRI brain w wo contrast  1 2 cm avidly enhancing lesion in left precentral gyrus with marked surrounding perilesional vasogenic edema, concerning for brain metastasis     Questionable tiny cortical enhancing lesion in left posterior frontal lobe only seen on bravo sequences slightly obscured by mild motion artifact   This could represent tiny brain metastasis or cortical vessel   Attention on follow-up    2 6 cm bilobed lesion in right nasopharynx with internal debris, likely complex mucus retention cyst   Recommend evaluation by ENT      2/13/23 Infusion Keytruda        Upcoming:  3/6/23 Infusion   6/8/23 Dr Ryan Mehta, [address]   Oncology History   Adenocarcinoma of lung   8/2021 Initial Diagnosis    Adenocarcinoma of lung     8/5/2021 Biopsy    Right lung apex, image-guided core needle biopsy:  - Adenocarcinoma      10/6/2021 - 1/26/2022 Chemotherapy    cyanocobalamin, 1,000 mcg, Intramuscular, Once, 3 of 3 cycles  Administration: 1,000 mcg (11/24/2021), 1,000 mcg (1/26/2022), 1,000 mcg (10/6/2021)  pegfilgrastim ([place]), 6 mg, Subcutaneous, Once, 1 of 1 cycle  Administration: 6 mg (11/26/2021)  pegfilgrastim (Shivani Go), 6 mg, Subcutaneous, Once, 6 of 6 cycles  Administration: 6 mg (10/6/2021), 6 mg (11/3/2021), 6 mg (11/24/2021), 6 mg (12/15/2021), 6 mg (1/5/2022)  fosaprepitant (EMEND) IVPB, 150 mg, Intravenous, Once, 6 of 6 cycles  Administration: 150 mg (10/6/2021), 150 mg (11/24/2021), 150 mg (12/15/2021), 150 mg (1/26/2022), 150 mg (11/3/2021), 150 mg (1/5/2022)  CARBOplatin (PARAPLATIN) IVPB (GOG AUC DOSING), 519 5 mg, Intravenous, Once, 6 of 6 cycles  Administration: 519 5 mg (10/6/2021), 574 mg (11/24/2021), 600 mg (12/15/2021), 533 mg (1/26/2022), 604 5 mg (11/3/2021), 563 mg (1/5/2022)  pemetrexed (ALIMTA) chemo infusion, 970 mg, Intravenous, Once, 6 of 6 cycles  Administration: 1,000 mg (10/6/2021), 1,000 mg (11/24/2021), 1,000 mg (12/15/2021), 1,000 mg (1/26/2022), 1,000 mg (11/3/2021), 1,000 mg (1/5/2022)  pembrolizumab (KEYTRUDA) IVPB, 200 mg, Intravenous, Once, 6 of 6 cycles  Administration: 200 mg (10/6/2021), 200 mg (11/24/2021), 200 mg (12/15/2021), 200 mg (1/26/2022), 200 mg (11/3/2021), 200 mg (1/5/2022)     3/7/2022 -  Chemotherapy    CARBOplatin (PARAPLATIN) IVPB ([facility] AUC DOSING), , Intravenous, Once, 6 of 6 cycles  Administration: 211 6 mg (3/7/2022), 208 mg (3/14/2022), 238 8 mg (3/21/2022), 211 6 mg (3/28/2022), 215 2 mg (4/4/2022), 213 4 mg (4/18/2022)  PACLItaxel (TAXOL) chemo IVPB, 50 mg/m2 = 99 mg, Intravenous, Once, 6 of 6 cycles  Administration: 99 mg (3/7/2022), 99 mg (3/14/2022), 99 mg (3/21/2022), 99 mg (3/28/2022), 99 mg (4/4/2022), 99 mg (4/18/2022)  pembrolizumab (KEYTRUDA) IVPB, 200 mg, Intravenous, Once, 17 of 22 cycles  Administration: 200 mg (3/7/2022), 200 mg (3/28/2022), 200 mg (4/25/2022), 200 mg (5/16/2022), 200 mg (6/6/2022), 200 mg (6/27/2022), 200 mg (7/18/2022), 200 mg (8/8/2022), 200 mg (8/29/2022), 200 mg (9/19/2022), 200 mg (10/10/2022), 200 mg (10/31/2022), 200 mg (11/21/2022), 200 mg (12/12/2022), 200 mg (1/3/2023), 200 mg (1/23/2023), 200 mg (2/13/2023)     Malignant neoplasm of upper lobe of right lung ([facility] [address] )   9/3/2021 Initial Diagnosis    Malignant neoplasm of upper lobe of right lung ([facility] [address] )     9/23/2021 -  Cancer Staged    Staging form: Lung, AJCC 8th Edition  - Clinical stage from 9/23/2021: Stage IIIC (cT3, cN3, cM0) - Signed by Theresa Stone MD on 9/23/2021  Histopathologic type:  Adenocarcinoma, NOS       10/6/2021 - 1/26/2022 Chemotherapy    cyanocobalamin, 1,000 mcg, Intramuscular, Once, 3 of 3 cycles  Administration: 1,000 mcg (11/24/2021), 1,000 mcg (1/26/2022), 1,000 mcg (10/6/2021)  pegfilgrastim (Marylee Bone), 6 mg, Subcutaneous, Once, 1 of 1 cycle  Administration: 6 mg (11/26/2021)  pegfilgrastim (Graciella Captain), 6 mg, Subcutaneous, Once, 6 of 6 cycles  Administration: 6 mg (10/6/2021), 6 mg (11/3/2021), 6 mg (11/24/2021), 6 mg (12/15/2021), 6 mg (1/5/2022)  fosaprepitant (EMEND) IVPB, 150 mg, Intravenous, Once, 6 of 6 cycles  Administration: 150 mg (10/6/2021), 150 mg (11/24/2021), 150 mg (12/15/2021), 150 mg (1/26/2022), 150 mg (11/3/2021), 150 mg (1/5/2022)  CARBOplatin (PARAPLATIN) IVPB ([facility] AUC DOSING), 519 5 mg, Intravenous, Once, 6 of 6 cycles  Administration: 519 5 mg (10/6/2021), 574 mg (11/24/2021), 600 mg (12/15/2021), 533 mg (1/26/2022), 604 5 mg (11/3/2021), 563 mg (1/5/2022)  pemetrexed (ALIMTA) chemo infusion, 970 mg, Intravenous, Once, 6 of 6 cycles  Administration: 1,000 mg (10/6/2021), 1,000 mg (11/24/2021), 1,000 mg (12/15/2021), 1,000 mg (1/26/2022), 1,000 mg (11/3/2021), 1,000 mg (1/5/2022)  pembrolizumab (KEYTRUDA) IVPB, 200 mg, Intravenous, Once, 6 of 6 cycles  Administration: 200 mg (10/6/2021), 200 mg (11/24/2021), 200 mg (12/15/2021), 200 mg (1/26/2022), 200 mg (11/3/2021), 200 mg (1/5/2022)     3/4/2022 - 4/21/2022 Radiation    The patient saw @[facility]@ for radiation treatment   This is the current list of radiation treatment:  Plan ID Energy Fractions Dose per Fraction (cGy) Dose Correction (cGy) Total Dose Delivered (cGy) Elapsed Days   R LUNGMED REV 6X 30 / 30 200 0 6,000 48      Treatment dates:  C1: 3/4/2022 - 4/21/2022         3/7/2022 -  Chemotherapy    CARBOplatin (PARAPLATIN) IVPB (GOG AUC DOSING), , Intravenous, Once, 6 of 6 cycles  Administration: 211 6 mg (3/7/2022), 208 mg (3/14/2022), 238 8 mg (3/21/2022), 211 6 mg (3/28/2022), 215 2 mg (4/4/2022), 213 4 mg (4/18/2022)  PACLItaxel (TAXOL) chemo IVPB, 50 mg/m2 = 99 mg, Intravenous, Once, 6 of 6 cycles  Administration: 99 mg (3/7/2022), 99 mg (3/14/2022), 99 mg (3/21/2022), 99 mg (3/28/2022), 99 mg (4/4/2022), 99 mg (4/18/2022)  pembrolizumab (KEYTRUDA) IVPB, 200 mg, Intravenous, Once, 17 of 22 cycles  Administration: 200 mg (3/7/2022), 200 mg (3/28/2022), 200 mg (4/25/2022), 200 mg (5/16/2022), 200 mg (6/6/2022), 200 mg (6/27/2022), 200 mg (7/18/2022), 200 mg (8/8/2022), 200 mg (8/29/2022), 200 mg (9/19/2022), 200 mg (10/10/2022), 200 mg (10/31/2022), 200 mg (11/21/2022), 200 mg (12/12/2022), 200 mg (1/3/2023), 200 mg (1/23/2023), 200 mg (2/13/2023)     Brain metastasis   2/3/2023 Initial Diagnosis    Brain metastasis           Past Medical History:   Diagnosis Date   • Chronic respiratory failure with hypoxia (HCC) 8/9/2021   • Impaired mobility and ADLs 8/13/2021   • Lung cancer ([facility] [address] )    • Stroke [facility])      Past Surgical History:   Procedure Laterality Date   • IR BIOPSY LUNG  8/5/2021   • IR THORACENTESIS  2/24/2022   • IR THORACENTESIS  8/1/2022       Family History   Problem Relation Age of Onset   • Prostate cancer Brother    • Lung cancer Brother        Social History   Social History     Substance and Sexual Activity   Alcohol Use Not Currently    Comment: No ETOH since Summer 2021      Social History     Substance and Sexual Activity   Drug Use Never     Social History     Tobacco Use   Smoking Status Former   • Packs/day: 3 00   • Years: 39 00   • Pack years: 117 00   • Types: Cigarettes   • Start date: 67   • Quit date:    • Years since quittin 1   Smokeless Tobacco Never         Meds/Allergies     Current Outpatient Medications:   •  acetaminophen (TYLENOL) 325 mg tablet, Take 2 tablets (650 mg total) by mouth 4 (four) times a day as needed for mild pain, headaches or fever, Disp: , Rfl: 0  •  albuterol (ProAir HFA) 90 mcg/act inhaler, Inhale 2 puffs every 6 (six) hours as needed for wheezing or shortness of breath, Disp: 8 g, Rfl: 0  •  atorvastatin (LIPITOR) 40 mg tablet, TAKE 1 TABLET BY MOUTH DAILY WITH DINNER, Disp: 90 tablet, Rfl: 0  •  dexamethasone (DECADRON) 2 mg tablet, Take 2 mg by mouth 2 (two) times a day with meals, Disp: , Rfl:   •  enoxaparin (LOVENOX) 80 mg/0 8 mL, INJECT 0 8 ML (80 MG TOTAL) UNDER THE SKIN EVERY 12 (TWELVE) HOURS, Disp: 144 mL, Rfl: 1  •  folic acid (FOLVITE) 1 mg tablet, TAKE 1 TABLET BY MOUTH EVERY DAY, Disp: 90 tablet, Rfl: 1  •  levETIRAcetam (Keppra) 1000 MG tablet, Take 1 tablet (1,000 mg total) by mouth 2 (two) times a day, Disp: 60 tablet, Rfl: 0  •  multivitamin (THERAGRAN) TABS, Take 1 tablet by mouth daily, Disp: , Rfl:   •  pantoprazole (PROTONIX) 40 mg tablet, Take 1 tablet (40 mg total) by mouth daily, Disp: 35 tablet, Rfl: 0  •  tamsulosin (FLOMAX) 0 4 mg, TAKE 1 CAPSULE (0 4 MG TOTAL) BY MOUTH DAILY AFTER DINNER, Disp: 90 capsule, Rfl: 0  Allergies   Allergen Reactions   • Doxycycline Rash         Review of Systems   Constitutional: Negative  HENT: Negative  Eyes: Negative  Respiratory: Negative  Cardiovascular: Negative      Gastrointestinal: Negative  Endocrine: Negative  Genitourinary: Negative  Musculoskeletal: Negative  Skin: Negative  Allergic/Immunologic: Negative  Neurological:        Right hand improved 75%   Hematological: Negative  Psychiatric/Behavioral: Positive for confusion (Patient denies, spouse nodding head yes )  OBJECTIVE:   Pulse 96   Temp 98 6 °F (37 °C)   Resp 18   Ht 5' 8" (1 727 m)   Wt 90 6 kg (199 lb 12 8 oz)   SpO2 99%   BMI 30 38 kg/m²   Pain Assessment:  0  Performance Status: ECOG/[place]/WHO: 1 - Symptomatic but completely ambulatory    Physical Exam   He is conversing appropriately  His breathing is unlabored  He is moving his right arm appropriately  Minimal weakness in the right hand  He does have slight difficulty in writing which she does slowly  Ambulating dependently  RESULTS  Lab Results    Chemistry        Component Value Date/Time    K 4 3 02/10/2023 0837     02/10/2023 0837    CO2 27 02/10/2023 0837    BUN 17 02/10/2023 0837    CREATININE 0 96 02/10/2023 0837        Component Value Date/Time    CALCIUM 10 0 02/10/2023 0837    ALKPHOS 59 02/10/2023 0837    AST 20 02/10/2023 0837    ALT 23 02/10/2023 0837            Lab Results   Component Value Date    WBC 11 31 (H) 02/10/2023    HGB 11 3 (L) 02/10/2023    HCT 38 7 02/10/2023    MCV 92 02/10/2023     (H) 02/10/2023         Imaging Studies  CTA head and neck with and without contrast    Result Date: 2/3/2023  Narrative: CTA NECK AND BRAIN WITH AND WITHOUT CONTRAST INDICATION: right hand weakness COMPARISON: CTA head and neck 7/31/2021 TECHNIQUE:  Routine CT imaging of the Brain without contrast   Post contrast imaging was performed after administration of iodinated contrast through the neck and brain  Post contrast axial 0 625 mm images timed to opacify the arterial system  3D rendering was performed on an independent workstation  MIP reconstructions performed   Coronal reconstructions were performed of the noncontrast portion of the brain  Radiation dose length product (DLP) for this visit:  1244 mGy-cm   This examination, like all CT scans performed in the [facility], was performed utilizing techniques to minimize radiation dose exposure, including the use of iterative reconstruction and automated exposure control  IV Contrast:  75 mL of iohexol (OMNIPAQUE) 75 mL of iohexol (OMNIPAQUE)  IMAGE QUALITY:   Diagnostic FINDINGS: NONCONTRAST BRAIN PARENCHYMA: There is a 1 cm posterior left frontal lobe gray-white matter junction enhancing lesion (series 3 image 69)  There is associated subjacent vasogenic edema  No significant mass effect or midline shift  Nonspecific  decreased attenuation involving periventricular and subcortical white matter, most compatible with mild microangiopathic change  Gray-white matter differentiation is grossly preserved  No acute intracranial hemorrhage  VENTRICLES AND EXTRA-AXIAL SPACES:  Normal for the patient's age  VISUALIZED ORBITS: Normal visualized orbits  PARANASAL SINUSES: Normal visualized paranasal sinuses  CERVICAL VASCULATURE AORTIC ARCH AND GREAT VESSELS: Anatomic variant common origin right brachiocephalic and left common coronary arteries  Atherosclerotic calcification of aortic arch  Great vessel origins are patent without significant stenosis  RIGHT VERTEBRAL ARTERY CERVICAL SEGMENT:The vessel is developmentally smaller than left  Mild atherosclerotic narrowing at the origin  The vessel is patent throughout the neck without high-grade stenosis  LEFT VERTEBRAL ARTERY CERVICAL SEGMENT: Mild atherosclerotic narrowing at the origin  The vessel is patent throughout the neck without high-grade stenosis  RIGHT EXTRACRANIAL CAROTID SEGMENT:Partially calcified atherosclerotic plaque at the bifurcation and proximal internal carotid artery    No hemodynamically significant stenosis of cervical ICA by NASCET criteria ( less than 50%) LEFT EXTRACRANIAL CAROTID SEGMENT: Partially calcified atherosclerotic plaque at the bifurcation and proximal internal carotid artery  No hemodynamically significant stenosis of cervical ICA by NASCET criteria ( less than 50%) INTRACRANIAL VASCULATURE INTERNAL CAROTID ARTERIES: The intracranial portions of the internal carotid arteries are unremarkable  Mild atherosclerotic change of bilateral cavernous and left supraclinoid ICAs with  Normal ophthalmic artery origins  ANTERIOR CIRCULATION:  Developmentally absent/hypoplastic right A1 segment  Bilateral anterior cerebral arteries are patent  Normal anterior comminuting artery  MIDDLE CEREBRAL ARTERY CIRCULATION:  Bilateral M1 segments and major M2 branches are patent without high-grade stenosis  DISTAL VERTEBRAL ARTERIES:  Distal vertebral arteries are patent without high-grade stenosis  Posterior inferior cerebellar artery origins are patent  BASILAR ARTERY:  Basilar artery is unremarkable  Normal superior cerebellar arteries  POSTERIOR CEREBRAL ARTERIES: Persistent fetal origin of right posterior cerebral arteries  Bilateral posterior cerebral arteries are patent without high-grade stenosis  Absent/hypoplastic left posterior communicating artery  DURAL VENOUS SINUSES:  Unremarkable  NON VASCULAR ANATOMY BONY STRUCTURES: Degenerative changes of cervical spine  No acute or aggressive appearing osseous abnormality  SOFT TISSUES OF THE NECK: Redemonstrated right nasopharyngeal mass measuring 2 6 x 2 7 cm, not significantly changed from 12/20/2021  THORACIC INLET: Pulmonary emphysema  Redemonstrated posttreatment change and partially obscure known right upper lobe mass in partially imaged upper chest       Impression: 1  No acute intracranial CT abnormality  2   A 1 cm posterior left frontal lobe gray-white matter junction lesion with subjacent vasogenic edema most concerning for metastasis  No significant mass effect or midline shift    Recommend further assessment with brain MRI ambulatory without and with contrast  3   Patent major vasculature of the [place] without high-grade stenosis  No aneurysm  4   No hemodynamically significant stenosis or dissection of cervical carotid and vertebral arteries  Left greater than right atherosclerotic change of cervical carotid arteries  5   No significant change in size of known right nasopharyngeal mass compared to neck CT 12/20/2021  6   Redemonstrated posttreatment change and partially obscure known right upper lobe mass in partially imaged upper chest   See the report of concurrent CTA chest/abdomen/pelvis  The study was marked in [facility] for immediate notification  Workstation performed: [id number]     XR chest 1 view portable    Result Date: 2/4/2023  Narrative: CHEST INDICATION:   possible stroke  COMPARISON:  CT 11/22/2022 EXAM PERFORMED/VIEWS:  XR CHEST PORTABLE FINDINGS: Cardiomediastinal silhouette appears unremarkable  Right upper lobe chronic consolidation is stable  Large right pleural effusion is stable  Left lung is clear  Osseous structures appear within normal limits for patient age  Impression: Stable chronic right upper lobe consolidation, consistent with posttreatment changes Stable large right pleural effusion  Workstation performed: [id number]     MRI brain w wo contrast    Result Date: 2/5/2023  Narrative: MRI BRAIN WITH AND WITHOUT CONTRAST INDICATION: Decreased right arm/hand  strength with suspected brain metastasis  COMPARISON: CTA head and neck February 3, 2023 CT stroke alert brain and CTA stroke alert head neck July 31, 2021  MRI brain with and without contrast August 3, 2021  TECHNIQUE: Multiplanar, multisequence imaging of the brain was performed before and after gadolinium administration  IV Contrast:  8 mL of Gadobutrol injection (SINGLE-DOSE)  IMAGE QUALITY:   Diagnostic   FINDINGS: BRAIN PARENCHYMA: 1 2 x 1 0 cm avidly enhancing lesion in left precentral gyrus with marked surrounding perilesional vasogenic edema in left frontoparietal lobe (10:25, 11:124)  Questionable tiny cortical enhancing lesion in left posterior frontal lobe only seen on bravo sequences slightly obscured by mild motion artifact (11:125)  Chronic infarct in left basal ganglia with hemosiderin deposition  Chronic tiny lacunar infarct in right putamen with hemosiderin deposition  No mass effect or midline shift  No acute intracranial hemorrhage  No diffusion-weighted signal abnormality to suggest acute infarction  Small dural calcification and left frontoparietal vertex  Right cerebellar developmental venous anomaly  Small scattered hyperintensities on T2/FLAIR imaging are noted in the periventricular and subcortical white matter demonstrating an appearance that is statistically most likely to represent mild microangiopathic change  VENTRICLES:  Ex-vacuo dilatation of left lateral ventricle  No acute intraventricular hemorrhage  SELLA AND PITUITARY GLAND:  Normal  ORBITS:  Sequela of right cataract surgery  Left orbit is normal  PARANASAL SINUSES:  Normal  NASOPHARYNX: 2 6 x 1 1 cm bilobed lesion in right nasopharynx with internal debris (5:5)  VASCULATURE:  Evaluation of the major intracranial vasculature demonstrates appropriate flow voids  CALVARIUM AND SKULL BASE:  Normal  EXTRACRANIAL SOFT TISSUES:  Normal      Impression: 1 2 cm avidly enhancing lesion in left precentral gyrus with marked surrounding perilesional vasogenic edema, concerning for brain metastasis  Questionable tiny cortical enhancing lesion in left posterior frontal lobe only seen on bravo sequences slightly obscured by mild motion artifact   This could represent tiny brain metastasis or cortical vessel  Attention on follow-up  2 6 cm bilobed lesion in right nasopharynx with internal debris, likely complex mucus retention cyst   Recommend evaluation by ENT  The study was marked in [facility] for immediate notification   Workstation performed: [id number]     CTA dissection protocol chest abdomen pelvis w wo contrast    Result Date: 2/3/2023  Narrative: CTA - CHEST, ABDOMEN AND PELVIS - WITHOUT AND WITH IV CONTRAST INDICATION:   abnormal appearing mediastinum on cxr, right hand weakness  COMPARISON:  February 3, 2023 TECHNIQUE: CT examination of the chest, abdomen and pelvis was performed both prior to and after the administration of intravenous contrast   The noncontrast portion of this examination was performed utilizing low radiation dose technique  Thin section  angiographic arterial phase post contrast technique was used in order to evaluate for aortic dissection  3D reformatted images and volume rendering were performed on an independent workstation  Additionally, axial, sagittal, and coronal 2D reformatted  images were created from the source data and submitted for interpretation  Radiation dose length product (DLP) for this visit:  1444 mGy-cm   This examination, like all CT scans performed in the [facility], was performed utilizing techniques to minimize radiation dose exposure, including the use of iterative reconstruction and automated exposure control  IV Contrast:  75 mL of iohexol (OMNIPAQUE) 75 mL of iohexol (OMNIPAQUE) Enteric Contrast:  Enteric contrast was not administered  FINDINGS: AORTA:  There is no aortic dissection or intramural hematoma  There is no aortic aneurysm   Ascending aorta; aorta at the aortic annulus measures 2 7 cm Aortic sinus measures 3 7 cm The tubular portion of ascending aorta measures 4 cm The brachiocephalic, common carotid artery are patent Subclavian arteries are patent Small right vertebral artery is small left vertebral artery The descending thoracic aorta is nonaneurysmal atherosclerotic plaquing seen in the descending thoracic aorta and in the distal arch Abdominal aorta is nonaneurysmal Aorta at the level of the aortic hiatus measures 2 9 cm Mesenteric vasculature; the celiac trunk is patent Hepatic artery is patent Splenic artery is patent SMA is patent  There is atherosclerotic plaquing around the proximal SMA, unchanged Renal arteries are patent Atherosclerotic changes are seen within the iliac arteries CHEST LUNGS:  The right lung is smaller  There is volume loss in the right upper lobe  There is shift of the mediastinum to the right side  There is a consolidation with the air bronchogram representing chronic consolidation, atelectasis right lower lung seen Trachea and central bronchi are patent Linear density seen at the left lung base due to atelectasis PLEURA:  There is chronic loculated effusion and nodular areas are seen within the loculated effusion suggesting pleural metastatic disease  These have enlarged since the previous study the largest lesion is seen in image 395 series 4 measuring about 2 cm, previously measuring 1 4 cm HEART/PULMONARY ARTERIAL TREE:  There are no filling defects seen in the main pulmonary artery Coronary artery calcification seen MEDIASTINUM AND JONH:  There is shift of the mediastinum to the right side  CHEST WALL AND LOWER NECK:   No significant axillary lymph node enlargement seen ABDOMEN LIVER/BILIARY TREE:  Subcentimeter hypodensities are seen within the liver parenchyma, unchanged The largest hypodensity seen in the right hepatic lobe segment 6 measuring about 1 0 cm GALLBLADDER:  Mild layering density within the gallbladder, raises concern for sludge versus gallstone SPLEEN:  Unremarkable  PANCREAS:  No pancreatic ductal dilation and no pancreatic atrophy ADRENAL GLANDS:  Unremarkable  KIDNEYS/URETERS:  Left renal cyst seen STOMACH AND BOWEL:  No abnormal dilation of bowel loops seen No bowel wall thickening seen APPENDIX:  No findings to suggest appendicitis  ABDOMINOPELVIC CAVITY:  No ascites or free intraperitoneal air  No lymphadenopathy   PELVIS REPRODUCTIVE ORGANS:  Enlarged prostate seen URINARY BLADDER:  There is hyperdensity within the urinary bladder on the left side, this may be due to left ureteral jet  ,  This limits the evaluation for focal lesion within the bladder ABDOMINAL WALL/INGUINAL REGIONS:  Nodular density seen in the left anterior abdominal wall in the subcutaneous tissue  This may be injection site  Additional similar nodular density was seen in the anterior subcutaneous wall on the right side is smaller OSSEOUS STRUCTURES:  No acute fracture or destructive osseous lesion  Impression: No evidence of thoracic aortic dissection No pulmonary embolism in the main pulmonary arteries Chronic loculated left effusion which demonstrates enlarging nodular densities within it, suggest pleural metastatic disease Post treatment changes in the right lung with chronic consolidation in the right paramediastinal location with the air bronchogram and volume loss, as seen on the previous study  I personally discussed this study with Dr Felipe Gao on 2/3/2023 at 4:00 PM  Workstation performed: [address] [place] [address]  1  Brain metastasis  Ambulatory Referral to Radiation Oncology        Cancer Staging   Malignant neoplasm of upper lobe of right lung [facility])  Staging form: Lung, AJCC 8th Edition  - Clinical stage from 9/23/2021: Stage IIIC (cT3, cN3, cM0) - Signed by Theresa Stone MD on 9/23/2021  Histopathologic type: Adenocarcinoma, NOS        PLAN/DISCUSSION  No orders of the defined types were placed in this encounter  Won Choi is a 72y o  year old male with stage IV non-small cell lung carcinoma  His case was reviewed at neuro-oncology rounds this morning  MRI of the brain reveals a solitary metastasis in the left precentral gyrus region with surrounding edema  On Bravo sequence there was questionable motion artifact versus small satellite lesion  As this is used for treatment planning I have recommended repeating the Peters sequence for precise tumor delineation for his SRS planning    I reviewed with him CT simulation procedure at which time a customized frameless radiosurgery mask will be made  Possible side effects both acute and long-term were reviewed with him  This can include but not limited to fatigue, swelling, radionecrosis  He was symptomatic and placed on steroids with improvement in his symptoms in his right hand  He is currently on a Decadron taper 4 mg twice daily  We discussed continuation of his taper down to 2 mg twice daily at time of treatment if tolerated  He is agreeable to the above outlined plan  Ailin Mcclure MD  2/67/6005,5:26 AM      Portions of the record may have been created with voice recognition software  Occasional wrong word or "sound a like" substitutions may have occurred due to the inherent limitations of voice recognition software  Read the chart carefully and recognize, using context, where substitutions have occurred

## 2025-03-09 VITALS
SYSTOLIC BLOOD PRESSURE: 129 MMHG | DIASTOLIC BLOOD PRESSURE: 69 MMHG | RESPIRATION RATE: 18 BRPM | OXYGEN SATURATION: 98 % | TEMPERATURE: 98 F | HEART RATE: 67 BPM

## 2025-03-09 PROBLEM — K29.70 GASTRITIS, UNSPECIFIED, WITHOUT BLEEDING: Chronic | Status: ACTIVE | Noted: 2025-01-10

## 2025-03-09 LAB
APPEARANCE UR: CLEAR — SIGNIFICANT CHANGE UP
BACTERIA # UR AUTO: NEGATIVE /HPF — SIGNIFICANT CHANGE UP
BILIRUB UR-MCNC: NEGATIVE — SIGNIFICANT CHANGE UP
CAST: 0 /LPF — SIGNIFICANT CHANGE UP (ref 0–4)
COLOR SPEC: YELLOW — SIGNIFICANT CHANGE UP
DIFF PNL FLD: NEGATIVE — SIGNIFICANT CHANGE UP
GLUCOSE UR QL: NEGATIVE MG/DL — SIGNIFICANT CHANGE UP
KETONES UR-MCNC: 40 MG/DL
LEUKOCYTE ESTERASE UR-ACNC: NEGATIVE — SIGNIFICANT CHANGE UP
NITRITE UR-MCNC: NEGATIVE — SIGNIFICANT CHANGE UP
PH UR: 7 — SIGNIFICANT CHANGE UP (ref 5–8)
PROT UR-MCNC: NEGATIVE MG/DL — SIGNIFICANT CHANGE UP
RBC CASTS # UR COMP ASSIST: 1 /HPF — SIGNIFICANT CHANGE UP (ref 0–4)
SP GR SPEC: 1.01 — SIGNIFICANT CHANGE UP (ref 1–1.03)
SQUAMOUS # UR AUTO: 2 /HPF — SIGNIFICANT CHANGE UP (ref 0–5)
UROBILINOGEN FLD QL: 0.2 MG/DL — SIGNIFICANT CHANGE UP (ref 0.2–1)
WBC UR QL: 0 /HPF — SIGNIFICANT CHANGE UP (ref 0–5)

## 2025-03-09 PROCEDURE — 93005 ELECTROCARDIOGRAM TRACING: CPT

## 2025-03-09 PROCEDURE — 84132 ASSAY OF SERUM POTASSIUM: CPT

## 2025-03-09 PROCEDURE — 82330 ASSAY OF CALCIUM: CPT

## 2025-03-09 PROCEDURE — 83605 ASSAY OF LACTIC ACID: CPT

## 2025-03-09 PROCEDURE — 96375 TX/PRO/DX INJ NEW DRUG ADDON: CPT

## 2025-03-09 PROCEDURE — 81001 URINALYSIS AUTO W/SCOPE: CPT

## 2025-03-09 PROCEDURE — 82947 ASSAY GLUCOSE BLOOD QUANT: CPT

## 2025-03-09 PROCEDURE — 85025 COMPLETE CBC W/AUTO DIFF WBC: CPT

## 2025-03-09 PROCEDURE — 84295 ASSAY OF SERUM SODIUM: CPT

## 2025-03-09 PROCEDURE — 80053 COMPREHEN METABOLIC PANEL: CPT

## 2025-03-09 PROCEDURE — 96374 THER/PROPH/DIAG INJ IV PUSH: CPT

## 2025-03-09 PROCEDURE — 82435 ASSAY OF BLOOD CHLORIDE: CPT

## 2025-03-09 PROCEDURE — 84484 ASSAY OF TROPONIN QUANT: CPT

## 2025-03-09 PROCEDURE — 84702 CHORIONIC GONADOTROPIN TEST: CPT

## 2025-03-09 PROCEDURE — 82803 BLOOD GASES ANY COMBINATION: CPT

## 2025-03-09 PROCEDURE — 85014 HEMATOCRIT: CPT

## 2025-03-09 PROCEDURE — 85018 HEMOGLOBIN: CPT

## 2025-03-09 PROCEDURE — 71045 X-RAY EXAM CHEST 1 VIEW: CPT

## 2025-03-09 PROCEDURE — 99285 EMERGENCY DEPT VISIT HI MDM: CPT | Mod: 25

## 2025-03-09 RX ORDER — ONDANSETRON HCL/PF 4 MG/2 ML
1 VIAL (ML) INJECTION
Qty: 1 | Refills: 0
Start: 2025-03-09

## 2025-03-13 ENCOUNTER — EMERGENCY (EMERGENCY)
Facility: HOSPITAL | Age: 25
LOS: 1 days | Discharge: HOME | End: 2025-03-13
Attending: EMERGENCY MEDICINE
Payer: MEDICAID

## 2025-03-13 VITALS
SYSTOLIC BLOOD PRESSURE: 118 MMHG | HEIGHT: 63 IN | WEIGHT: 139.99 LBS | TEMPERATURE: 98 F | RESPIRATION RATE: 18 BRPM | HEART RATE: 78 BPM | OXYGEN SATURATION: 96 % | DIASTOLIC BLOOD PRESSURE: 86 MMHG

## 2025-03-13 VITALS
RESPIRATION RATE: 18 BRPM | SYSTOLIC BLOOD PRESSURE: 117 MMHG | HEART RATE: 80 BPM | OXYGEN SATURATION: 98 % | TEMPERATURE: 98 F | DIASTOLIC BLOOD PRESSURE: 74 MMHG

## 2025-03-13 DIAGNOSIS — R09.A0 FOREIGN BODY SENSATION, UNSPECIFIED: ICD-10-CM

## 2025-03-13 PROCEDURE — 31505 DIAGNOSTIC LARYNGOSCOPY: CPT

## 2025-03-13 PROCEDURE — 99284 EMERGENCY DEPT VISIT MOD MDM: CPT

## 2025-03-13 PROCEDURE — 99285 EMERGENCY DEPT VISIT HI MDM: CPT | Mod: 25

## 2025-03-13 RX ORDER — MAGNESIUM, ALUMINUM HYDROXIDE 200-200 MG
30 TABLET,CHEWABLE ORAL ONCE
Refills: 0 | Status: COMPLETED | OUTPATIENT
Start: 2025-03-13 | End: 2025-03-13

## 2025-03-13 RX ADMIN — Medication 30 MILLILITER(S): at 22:50

## 2025-03-13 NOTE — ED ADULT TRIAGE NOTE - NS ED TRIAGE AVPU SCALE
Alert-The patient is alert, awake and responds to voice. The patient is oriented to time, place, and person. The triage nurse is able to obtain subjective information.
22-Jan-2024 14:25

## 2025-03-13 NOTE — ED ADULT TRIAGE NOTE - CHIEF COMPLAINT QUOTE
After eating Chicken pt feels foreign body sensation in throat. No difficulty speaking in complete sentences, maintaining secretions.

## 2025-03-13 NOTE — ED PROVIDER NOTE - NSFOLLOWUPINSTRUCTIONS_ED_ALL_ED_FT
Please follow up with your primary care doctor in 1-3 days.    We did not see any evidence of foreign body/food in your throat on exam. It is likely sensation of foreign body, which typically resolves on it's own. It may improve with over the counter medications such as tylenol, mylanta or maalox.    Regarding your symptoms of anorectal pain, this is most consistent with a hemorrhoid or anal fissure.  Please read the printed material on treatment for this which include measures such as topical prop Preparation H, sitz bath's, increase fiber in your diet, MiraLAX.    Return to the emergency department if you have worsening pain, difficulty breathing, difficulty swallowing, dark or bloody stools,, chills, or any other concerns.

## 2025-03-13 NOTE — ED PROVIDER NOTE - PATIENT PORTAL LINK FT
You can access the FollowMyHealth Patient Portal offered by [facility] by registering at the following website: [email or web address]. By joining [facility]’s FollowMyHealth portal, you will also be able to view your health information using other applications (apps) compatible with our system.

## 2025-03-13 NOTE — ED PROVIDER NOTE - ATTENDING APP SHARED VISIT CONTRIBUTION OF CARE
Patient is a 24-year-old female presenting with foreign body sensation to her proximal anterior throat went to urgent care they did an x-ray which she states they told her was normal but she still has a foreign body sensation suicide to come to the ER having no shortness of breath heart issues tolerating secretion.  Patient is well-appearing has had prior colonoscopy due to GI bleed a year or 2 ago which she says was normal.  HEENT exam is normal.  To consider ENT consult for npl.

## 2025-03-13 NOTE — ED PROVIDER NOTE - MUSCULOSKELETAL, MLM
Spine appears normal, range of motion is not limited, no muscle or joint tenderness. No joint tenderness/swelling

## 2025-03-13 NOTE — ED ADULT NURSE REASSESSMENT NOTE - NS ED NURSE REASSESS COMMENT FT1
Report received from DAISY Young. Pt alert & oriented x 3. Breathing spontaneous and nonlabored. Pt airway clear and patent, denying throat pain. Pt made aware of plan of care.

## 2025-03-13 NOTE — CONSULT NOTE ADULT - PROBLEM SELECTOR RECOMMENDATION 9
- consider ppi   - Pt is to follow up at [facility] ENT clinic in 2 weeks. Call [phone] to make appointment.

## 2025-03-13 NOTE — ED ADULT NURSE NOTE - OBJECTIVE STATEMENT
Pt is a 23 y/o female no pertinent PMH presents to the ED c/o throat pain. Pt states she was eating dinner tonight and afterwards, has a foreign body sensation in her throat. Pt reports the food she ate was chicken and rice. Went to  where they did an xray and results came back negative. Pt breathing spontaneous, unlabored, no SOB, no chest pain, denies abdominal pain, G/ symptoms or any blood in sputum/mouth.

## 2025-03-13 NOTE — ED PROVIDER NOTE - ENMT, MLM
Airway patent no drooling, no visible FB, Nasal mucosa clear. Mouth with normal mucosa. Throat has no vesicles, no oropharyngeal exudates and uvula is midline.

## 2025-03-13 NOTE — CONSULT NOTE ADULT - ASSESSMENT
24-year-old female presents with multiple medical complaints.  Patient reports 2 hours ago was eating a chicken breast with rice, felt that something got stuck in her throat while she was eating with 1 coughing fit.  Reports mild discomfort when she swallows, can localize with 1 finger at anterior neck where she feels the symptoms.  Denies coughing or known aspiration.  Went to urgent care where they had an x-ray of the neck performed, did not see foreign body and sent to the ER.  Denies shortness of breath, nausea, vomiting, hemoptysis.  Secondary complaint of intermittent anal rectal pain for several days reports sharp pain with defecation and tenderness to anal area.  Patient expressed concerns for colon cancer, reports she had a colonoscopy 1 year ago within normal limits.  Denies weight changes or melena/hematochezia.  Has not tried OTC treatments.  Additional complaint of intermittent pain in bilateral thighs for over 1 year after cosmetic gluteal injections, does not have pain at this time.  Denies fevers, chills, chest pain, shortness of breath. fiberoptic laryngoscopy without any foreign body

## 2025-03-13 NOTE — ED PROVIDER NOTE - GASTROINTESTINAL, MLM
Abdomen soft, non-tender, no guarding. Right ear hearing screen completed date: 2022  Right ear screen method: EOAE (evoked otoacoustic emission)  Right ear screen result: Passed  Right ear screen comment: N/A    Left ear hearing screen completed date: 2022  Left ear screen method: EOAE (evoked otoacoustic emission)  Left ear screen result: Passed  Left ear screen comments: N/A

## 2025-03-13 NOTE — ED PROVIDER NOTE - OBJECTIVE STATEMENT
24-year-old female presents with multiple medical complaints.  Patient reports 2 hours ago was eating a chicken breast with rice, felt that something got stuck in her throat while she was eating with 1 coughing fit.  Reports mild discomfort when she swallows, can localize with 1 finger at anterior neck where she feels the symptoms.  Denies coughing or known aspiration.  Went to urgent care where they had an x-ray of the neck performed, did not see foreign body and sent to the ER.  Denies shortness of breath, nausea, vomiting, hemoptysis.  Secondary complaint of intermittent anal rectal pain for several days reports sharp pain with defecation and tenderness to anal area.  Patient expressed concerns for colon cancer, reports she had a colonoscopy 1 year ago within normal limits.  Denies weight changes or melena/hematochezia.  Has not tried OTC treatments.  Additional complaint of intermittent pain in bilateral thighs for over 1 year after cosmetic gluteal injections, does not have pain at this time.  Denies fevers, chills, chest pain, shortness of breath

## 2025-03-13 NOTE — ED PROVIDER NOTE - PROGRESS NOTE DETAILS
spoke with ENT ERNESTO Hernandes for consult for FB sensation, will see pt in ED.  - Shashi De Leon PA-C spoke with ENT ERNESTO Carrillo for consult for FB sensation, will see pt in ED.  - Shashi De Leon PA-C 0 no FB seen on ENT scope, likely FB sensation. pt was counseled on additional concerns for perianal pain, intermittent thigh pain. declined ELLYN exam, we discussed most likely symptoms are hemorrhoids/fissure and discussed treatment options, recommended followup with PMD. Will dc with follow up. Discussed plan and return precautions with patient who understands and agrees. All questions answered. - Shashi De Leon PA-C

## 2025-03-13 NOTE — ED ADULT NURSE NOTE - NSFALLUNIVINTERV_ED_ALL_ED
Bed/Stretcher in lowest position, wheels locked, appropriate side rails in place/Call bell, personal items and telephone in reach/Instruct patient to call for assistance before getting out of bed/chair/stretcher/Non-slip footwear applied when patient is off stretcher/[place] to call system/Physically safe environment - no spills, clutter or unnecessary equipment/Purposeful proactive rounding/Room/bathroom lighting operational, light cord in reach

## 2025-03-13 NOTE — CONSULT NOTE ADULT - SUBJECTIVE AND OBJECTIVE BOX
CC: fb sensation     HPI:   24-year-old female presents with multiple medical complaints.  Patient reports 2 hours ago was eating a chicken breast with rice, felt that something got stuck in her throat while she was eating with 1 coughing fit.  Reports mild discomfort when she swallows, can localize with 1 finger at anterior neck where she feels the symptoms.  Denies coughing or known aspiration.  Went to urgent care where they had an x-ray of the neck performed, did not see foreign body and sent to the ER.  Denies shortness of breath, nausea, vomiting, hemoptysis.  Secondary complaint of intermittent anal rectal pain for several days reports sharp pain with defecation and tenderness to anal area.  Patient expressed concerns for colon cancer, reports she had a colonoscopy 1 year ago within normal limits.  Denies weight changes or melena/hematochezia.  Has not tried OTC treatments.  Additional complaint of intermittent pain in bilateral thighs for over 1 year after cosmetic gluteal injections, does not have pain at this time.  Denies fevers, chills, chest pain, shortness of breath    PAST MEDICAL & SURGICAL HISTORY:  UTI (urinary tract infection)      Gastritis      Gastritis, Helicobacter pylori      No significant past surgical history        Allergies    No Known Allergies    Intolerances      MEDICATIONS  (STANDING):    MEDICATIONS  (PRN):      Social History: no tobacco, no etoh     Family history: Pt denies any sign FHx    ROS:   ENT: all negative except as noted in HPI   CV: denies palpitations  Pulm: denies SOB, cough, hemoptysis  GI: denies change in apetite, indigestion, n/v  : denies pertinent urinary symptoms, urgency  Neuro: denies numbness/tingling, loss of sensation  Psych: denies anxiety  MS: denies muscle weakness, instability  Heme: denies easy bruising or bleeding  Endo: denies heat/cold intolerance, excessive sweating  Vascular: denies LE edema    Vital Signs Last 24 Hrs  T(C): 36.6 (13 Mar 2025 22:52), Max: 36.7 (13 Mar 2025 21:53)  T(F): 97.8 (13 Mar 2025 22:52), Max: 98 (13 Mar 2025 21:53)  HR: 79 (13 Mar 2025 22:52) (78 - 79)  BP: 117/84 (13 Mar 2025 22:52) (117/84 - 118/86)  BP(mean): --  RR: 16 (13 Mar 2025 22:52) (16 - 18)  SpO2: 97% (13 Mar 2025 22:52) (96% - 97%)    Parameters below as of 13 Mar 2025 22:52  Patient On (Oxygen Delivery Method): room air                  PHYSICAL EXAM:  Gen: NAD  Skin: No rashes, bruises, or lesions  Head: Normocephalic, Atraumatic  Face: no edema, erythema, or fluctuance. Parotid glands soft without mass  Eyes: no scleral injection  Ears: Right - ear canal clear, TM intact without effusion or erythema. No evidence of any fluid drainage. No mastoid tenderness, erythema, or ear bulging            Left - ear canal clear, TM intact without effusion or erythema. No evidence of any fluid drainage. No mastoid tenderness, erythema, or ear bulging  Nose: Nares bilaterally patent, no discharge  Mouth: No Stridor / Drooling / Trismus.  Mucosa moist, tongue/uvula midline, oropharynx clear  Neck: Flat, supple, no lymphadenopathy, trachea midline, no masses  Lymphatic: No lymphadenopathy  Resp: breathing easily, no stridor  CV: no peripheral edema/cyanosis  GI: nondistended   Peripheral vascular: no JVD or edema  Neuro: facial nerve intact, no facial droop        Fiberoptic Indirect laryngoscopy:  (Scope #2 used) Reason for Laryngoscopy:    Patient was unable to cooperate with mirror.  Nasopharynx, oropharynx, and hypopharynx clear, no bleeding. Tongue base, posterior pharyngeal wall, vallecula, epiglottis, and subglottis appear normal. No erythema, edema, pooling of secretions, masses or lesions. Airway patent, no foreign body visualized. No glottic/supraglottic edema. True vocal cords, arytenoids, vestibular folds, ventricles, pyriform sinuses, and aryepiglottic folds appear normal bilaterally. Vocal cords mobile with good contact b/l.              IMAGING/ADDITIONAL STUDIES: < from: Xray Chest 1 View- PORTABLE-Urgent (Xray Chest 1 View- PORTABLE-Urgent .) (03.08.25 @ 23:32) >    IMPRESSION:  Clear lungs.    < end of copied text >

## 2025-03-20 ENCOUNTER — EMERGENCY (EMERGENCY)
Facility: HOSPITAL | Age: 25
LOS: 1 days | Discharge: HOME | End: 2025-03-20
Attending: EMERGENCY MEDICINE
Payer: MEDICAID

## 2025-03-20 VITALS
OXYGEN SATURATION: 97 % | RESPIRATION RATE: 19 BRPM | DIASTOLIC BLOOD PRESSURE: 82 MMHG | SYSTOLIC BLOOD PRESSURE: 126 MMHG | HEART RATE: 100 BPM | TEMPERATURE: 98 F | HEIGHT: 63 IN | WEIGHT: 145.06 LBS

## 2025-03-20 VITALS
DIASTOLIC BLOOD PRESSURE: 70 MMHG | HEART RATE: 80 BPM | OXYGEN SATURATION: 95 % | RESPIRATION RATE: 19 BRPM | SYSTOLIC BLOOD PRESSURE: 113 MMHG | TEMPERATURE: 98 F

## 2025-03-20 LAB
ALBUMIN SERPL ELPH-MCNC: 4.5 G/DL — SIGNIFICANT CHANGE UP (ref 3.3–5)
ALP SERPL-CCNC: 75 U/L — SIGNIFICANT CHANGE UP (ref 40–120)
ALT FLD-CCNC: 12 U/L — SIGNIFICANT CHANGE UP (ref 10–45)
ANION GAP SERPL CALC-SCNC: 17 MMOL/L — SIGNIFICANT CHANGE UP (ref 5–17)
APTT BLD: 30 SEC — SIGNIFICANT CHANGE UP (ref 24.5–35.6)
AST SERPL-CCNC: 17 U/L — SIGNIFICANT CHANGE UP (ref 10–40)
BASOPHILS # BLD AUTO: 0.03 K/UL — SIGNIFICANT CHANGE UP (ref 0–0.2)
BASOPHILS NFR BLD AUTO: 0.3 % — SIGNIFICANT CHANGE UP (ref 0–2)
BILIRUB SERPL-MCNC: 0.6 MG/DL — SIGNIFICANT CHANGE UP (ref 0.2–1.2)
BUN SERPL-MCNC: 11 MG/DL — SIGNIFICANT CHANGE UP (ref 7–23)
CALCIUM SERPL-MCNC: 9.6 MG/DL — SIGNIFICANT CHANGE UP (ref 8.4–10.5)
CHLORIDE SERPL-SCNC: 101 MMOL/L — SIGNIFICANT CHANGE UP (ref 96–108)
CO2 SERPL-SCNC: 19 MMOL/L — LOW (ref 22–31)
CREAT SERPL-MCNC: 0.53 MG/DL — SIGNIFICANT CHANGE UP (ref 0.5–1.3)
EGFR: 134 ML/MIN/1.73M2 — SIGNIFICANT CHANGE UP
EGFR: 134 ML/MIN/1.73M2 — SIGNIFICANT CHANGE UP
EOSINOPHIL # BLD AUTO: 0.03 K/UL — SIGNIFICANT CHANGE UP (ref 0–0.5)
EOSINOPHIL NFR BLD AUTO: 0.3 % — SIGNIFICANT CHANGE UP (ref 0–6)
GLUCOSE SERPL-MCNC: 101 MG/DL — HIGH (ref 70–99)
HCG SERPL-ACNC: <2 MIU/ML — SIGNIFICANT CHANGE UP
HCT VFR BLD CALC: 38.7 % — SIGNIFICANT CHANGE UP (ref 34.5–45)
HGB BLD-MCNC: 13.2 G/DL — SIGNIFICANT CHANGE UP (ref 11.5–15.5)
IMM GRANULOCYTES NFR BLD AUTO: 0.3 % — SIGNIFICANT CHANGE UP (ref 0–0.9)
INR BLD: 1.16 RATIO — SIGNIFICANT CHANGE UP (ref 0.85–1.16)
LYMPHOCYTES # BLD AUTO: 1.67 K/UL — SIGNIFICANT CHANGE UP (ref 1–3.3)
LYMPHOCYTES # BLD AUTO: 18.2 % — SIGNIFICANT CHANGE UP (ref 13–44)
MCHC RBC-ENTMCNC: 30.6 PG — SIGNIFICANT CHANGE UP (ref 27–34)
MCHC RBC-ENTMCNC: 34.1 G/DL — SIGNIFICANT CHANGE UP (ref 32–36)
MCV RBC AUTO: 89.8 FL — SIGNIFICANT CHANGE UP (ref 80–100)
MONOCYTES # BLD AUTO: 0.47 K/UL — SIGNIFICANT CHANGE UP (ref 0–0.9)
MONOCYTES NFR BLD AUTO: 5.1 % — SIGNIFICANT CHANGE UP (ref 2–14)
NEUTROPHILS # BLD AUTO: 6.96 K/UL — SIGNIFICANT CHANGE UP (ref 1.8–7.4)
NEUTROPHILS NFR BLD AUTO: 75.8 % — SIGNIFICANT CHANGE UP (ref 43–77)
NRBC BLD AUTO-RTO: 0 /100 WBCS — SIGNIFICANT CHANGE UP (ref 0–0)
PLATELET # BLD AUTO: 348 K/UL — SIGNIFICANT CHANGE UP (ref 150–400)
POTASSIUM SERPL-MCNC: 3.7 MMOL/L — SIGNIFICANT CHANGE UP (ref 3.5–5.3)
POTASSIUM SERPL-SCNC: 3.7 MMOL/L — SIGNIFICANT CHANGE UP (ref 3.5–5.3)
PROT SERPL-MCNC: 8.1 G/DL — SIGNIFICANT CHANGE UP (ref 6–8.3)
PROTHROM AB SERPL-ACNC: 13.2 SEC — SIGNIFICANT CHANGE UP (ref 9.9–13.4)
RBC # BLD: 4.31 M/UL — SIGNIFICANT CHANGE UP (ref 3.8–5.2)
RBC # FLD: 13.9 % — SIGNIFICANT CHANGE UP (ref 10.3–14.5)
SODIUM SERPL-SCNC: 137 MMOL/L — SIGNIFICANT CHANGE UP (ref 135–145)
TROPONIN T, HIGH SENSITIVITY RESULT: <6 NG/L — SIGNIFICANT CHANGE UP (ref 0–51)
WBC # BLD: 9.19 K/UL — SIGNIFICANT CHANGE UP (ref 3.8–10.5)
WBC # FLD AUTO: 9.19 K/UL — SIGNIFICANT CHANGE UP (ref 3.8–10.5)

## 2025-03-20 PROCEDURE — 93010 ELECTROCARDIOGRAM REPORT: CPT

## 2025-03-20 PROCEDURE — 80053 COMPREHEN METABOLIC PANEL: CPT

## 2025-03-20 PROCEDURE — 96374 THER/PROPH/DIAG INJ IV PUSH: CPT

## 2025-03-20 PROCEDURE — 84702 CHORIONIC GONADOTROPIN TEST: CPT

## 2025-03-20 PROCEDURE — 85610 PROTHROMBIN TIME: CPT

## 2025-03-20 PROCEDURE — 84484 ASSAY OF TROPONIN QUANT: CPT

## 2025-03-20 PROCEDURE — 71046 X-RAY EXAM CHEST 2 VIEWS: CPT

## 2025-03-20 PROCEDURE — 71046 X-RAY EXAM CHEST 2 VIEWS: CPT | Mod: 26

## 2025-03-20 PROCEDURE — 85730 THROMBOPLASTIN TIME PARTIAL: CPT

## 2025-03-20 PROCEDURE — 99285 EMERGENCY DEPT VISIT HI MDM: CPT | Mod: 25

## 2025-03-20 PROCEDURE — 85025 COMPLETE CBC W/AUTO DIFF WBC: CPT

## 2025-03-20 PROCEDURE — 93005 ELECTROCARDIOGRAM TRACING: CPT

## 2025-03-20 PROCEDURE — 99285 EMERGENCY DEPT VISIT HI MDM: CPT

## 2025-03-20 RX ORDER — KETOROLAC TROMETHAMINE 30 MG/ML
15 INJECTION, SOLUTION INTRAMUSCULAR; INTRAVENOUS ONCE
Refills: 0 | Status: DISCONTINUED | OUTPATIENT
Start: 2025-03-20 | End: 2025-03-20

## 2025-03-20 RX ADMIN — KETOROLAC TROMETHAMINE 15 MILLIGRAM(S): 30 INJECTION, SOLUTION INTRAMUSCULAR; INTRAVENOUS at 21:40

## 2025-03-20 NOTE — ED ADULT NURSE NOTE - NSFALLUNIVINTERV_ED_ALL_ED
Assistance OOB with selected safe patient handling equipment if applicable/Bed/Stretcher in lowest position, wheels locked, appropriate side rails in place/Call bell, personal items and telephone in reach/Instruct patient to call for assistance before getting out of bed/chair/stretcher/Non-slip footwear applied when patient is off stretcher/[place] to call system/Physically safe environment - no spills, clutter or unnecessary equipment/Purposeful proactive rounding/Room/bathroom lighting operational, light cord in reach

## 2025-03-20 NOTE — ED ADULT NURSE NOTE - OBJECTIVE STATEMENT
The patient is a 24y female presenting to the ED from home for complaints of chest pain, tingling sensation to the toes, and difficulty breathing. Patient presents with no relevant chronic PMH and notes recently receiving body filler injections to the hips today around 1630. She states during injections the patient  felt like they were more painful than usual and did not think the provider properly administered injections. About 10mins later the patient began to present with sudden onset sensation of her heart racing, SOB, and chest tightness with noted anxiety. Patient also states she was presenting with increased pain to the injection sites which has never happened before, noting 5x previous injection episodes. Patient noted to present with bilateral injections sites to the bilateral hips, absent of redness, edema, tenderness, and drainage. Upon assessment Pt denies chest pain, palpitations, shortness of breath, headache, visual disturbances, numbness/tingling, fever, chills, diaphoresis,  nausea, vomiting, constipation, diarrhea, or urinary symptoms at this time. Safety and comfort measures provided, bed locked and in lowest position, side rails up for safety. Awaiting Discharge.

## 2025-03-20 NOTE — ED PROVIDER NOTE - ED STEMI HIDDEN
hide H Plasty Text: Given the location of the defect, shape of the defect and the proximity to free margins a H-plasty was deemed most appropriate for repair.  Using a sterile surgical marker, the appropriate advancement arms of the H-plasty were drawn incorporating the defect and placing the expected incisions within the relaxed skin tension lines where possible. The area thus outlined was incised deep to adipose tissue with a #15 scalpel blade. The skin margins were undermined to an appropriate distance in all directions utilizing iris scissors.  The opposing advancement arms were then advanced into place in opposite direction and anchored with interrupted buried subcutaneous sutures.

## 2025-03-20 NOTE — ED PROVIDER NOTE - CLINICAL SUMMARY MEDICAL DECISION MAKING FREE TEXT BOX
24-year-old female no medical history presents to the emergency department for evaluation of heart racing sensation, chest tightness, shortness of breath, tingling in her fingertips and toes after she got body if filler injections today.Vital signs are stable, EKG is nonischemic, troponin is negative.  On exam patient has  Bilateral hips with punctate injection sites, no overlying erythema, no hematoma, no drainage, compartments soft, no tenderness over the hip, patient is ambulating.  Likely anxiety induced tingling, lightheadedness, chest pain from hyperventilation.  Patient is well-appearing currently, asymptomatic, states she is in no more pain after Toradol.  Stable for discharge with strict return precautions.  Patient knows to monitor the area for injections for any signs of infection including drainage or overlying erythema. 24-year-old female no medical history presents to the emergency department for evaluation of heart racing sensation, chest tightness, shortness of breath, tingling in her fingertips and toes after she got body if filler injections today.Vital signs are stable, EKG is nonischemic, troponin is negative.  On exam patient has  Bilateral hips with punctate injection sites, no overlying erythema, no hematoma, no drainage, compartments soft, no tenderness over the hip, patient is ambulating.  Likely anxiety induced tingling, lightheadedness, chest pain from hyperventilation.  Patient is well-appearing currently, asymptomatic, states she is in no more pain after Toradol.  Stable for discharge with strict return precautions.  Patient knows to monitor the area for injections for any signs of infection including drainage or overlying erythema. ZR

## 2025-03-20 NOTE — ED PROVIDER NOTE - RAPID ASSESSMENT
24-year-old female no reported chronic medical history presents ED complaining of heart racing sensation, chest tightness and shortness of breath in setting of body filler injections today. She states "I feel like I am having a reaction to the injections".  Had body filler injections to both hips today at about 4:30 PM, during injections she felt like the injections were more painful than usual and did not think the provider was doing them correctly.  About 10 minutes later patient started to feel heart racing sensation and states she got anxious that the injections were done wrong, developed chest tightness and shortness of breath afterwards as well which she is still feeling.  Additionally states she is having increasing pain to the injection sites which she has never had before with her 5 prior injections.  Denies abdominal pain, nausea/vomiting, fever/chills.    **Patient was rapidly assessed by me, Aj Dubois PA-C. A limited history was obtained. The patient will be seen and further examined/worked up in the main Emergency Department and their care will be completed by the main ED team along with a thorough physical exam. The receiving Emergency Department team will follow up on labs, analgesia, any clinical imaging, and perform reassessment and disposition of the patient as clinically indicated. All decisions regarding the progression of care will be made at their discretion. The patient was counseled on the limited nature of this encounter and that their care will continue in the main Emergency Department. 24-year-old female no reported chronic medical history presents ED complaining of heart racing sensation, chest tightness and shortness of breath in setting of body filler injections today. She states "I feel like I am having a reaction to the injections".  Had body filler injections to both hips today at about 4:30 PM, during injections she felt like the injections were more painful than usual and did not think the provider was doing them correctly.  About 10 minutes later patient started to feel heart racing sensation and states she got anxious that the injections were done wrong, developed chest tightness and shortness of breath afterwards as well which she is still feeling.  Additionally states she is having increasing pain to the injection sites which she has never had before with her 5 prior injections.  Denies abdominal pain, nausea/vomiting, fever/chills, rash, throat closing sensation.    **Patient was rapidly assessed by me, Aj Dubois PA-C. A limited history was obtained. The patient will be seen and further examined/worked up in the main Emergency Department and their care will be completed by the main ED team along with a thorough physical exam. The receiving Emergency Department team will follow up on labs, analgesia, any clinical imaging, and perform reassessment and disposition of the patient as clinically indicated. All decisions regarding the progression of care will be made at their discretion. The patient was counseled on the limited nature of this encounter and that their care will continue in the main Emergency Department. 24-year-old female no reported chronic medical history presents ED complaining of heart racing sensation, chest tightness and shortness of breath in setting of body filler injections today. She states "I feel like I am having a reaction to the injections".  Had body filler injections to both hips today at about 4:30 PM, during injections she felt like the injections were more painful than usual and did not think the provider was doing them correctly.  About 10 minutes later patient started to feel heart racing sensation and states she got anxious that the injections were done wrong, developed chest tightness and shortness of breath afterwards as well which she is still feeling.  Additionally states she is having increasing pain to the injection sites which she has never had before with her 5 prior injections.  Denies abdominal pain, nausea/vomiting, fever/chills, rash, throat closing sensation.    **Patient was rapidly assessed by me, Aj Dubois PA-C. A limited history was obtained. The patient will be seen and further examined/worked up in the main Emergency Department and their care will be completed by the main ED team along with a thorough physical exam. The receiving Emergency Department team will follow up on labs, analgesia, any clinical imaging, and perform reassessment and disposition of the patient as clinically indicated. All decisions regarding the progression of care will be made at their discretion. The patient was counseled on the limited nature of this encounter and that their care will continue in the main Emergency Department.    Attending MD Thomson: This patient was seen and orders were placed by the PA as per our department's QPA model.  I was not consulted in regards to this patient although I was present and available in the Emergency Department to the PA.  Patient was to be sent to main ED for full medical evaluation and receiving team was to follow up on any labs, analgesia, clinical imaging ordered by the PA.  Any reassessment and disposition decisions were to be made by receiving team as clinically indicated, all decisions regarding the progression of care to be made at their discretion.  I did not perform a comprehensive history and physical on this patient.

## 2025-03-20 NOTE — ED ADULT TRIAGE NOTE - AS HEIGHT TYPE
Total Treatment Time: 3:02 Protocol For Photochemotherapy For Severe Photoresponsive Dermatoses: Tar And Nbuvb (Goeckerman Treatment): The patient received Photochemotherapy for severe photoresponsive dermatoses: Tar and NBUVB (Goeckerman treatment) requiring at least 4 to 8 hours of care under direct physician supervision. Consent: Written consent obtained. The risks were reviewed with the patient including but not limited to: burn, pigmentary changes, pain, blistering, scabbing, redness, increased risk of skin cancers, and the remote possibility of scarring. Protocol For Photochemotherapy For Severe Photoresponsive Dermatoses: Puva: The patient received Photochemotherapy for severe photoresponsive dermatoses: PUVA requiring at least 4 to 8 hours of care under direct physician supervision. Protocol For Photochemotherapy: Petrolatum And Nbuvb: The patient received Photochemotherapy: Petrolatum and NBUVB (petrolatum applied to all lesions prior to phototherapy). Protocol For Photochemotherapy For Severe Photoresponsive Dermatoses: Petrolatum And Nbuvb: The patient received Photochemotherapy for severe photoresponsive dermatoses: Petrolatum and NBUVB requiring at least 4 to 8 hours of care under direct physician supervision. Protocol For Photochemotherapy: Petrolatum And Broad Band Uvb: The patient received Photochemotherapy: Petrolatum and Broad Band UVB. Protocol For Broad Band Uvb: The patient received Broad Band UVB. Changes In Treatment Protocol: Held at 618 mj today at patientâs request Protocol For Photochemotherapy: Triamcinolone Ointment And Nbuvb: The patient received Photochemotherapy: Triamcinolone and NBUVB (triamcinolone ointment applied to all lesions prior to phototherapy). Irradiance (Optional- Include Units): 3.4 Protocol For Photochemotherapy: Mineral Oil And Broad Band Uvb: The patient received Photochemotherapy: Mineral Oil and Broad Band UVB. Protocol: NBUVB Protocol For Protocol For Photochemotherapy For Severe Photoresponsive Dermatoses: Bath Puva: The patient received Photochemotherapy for severe photoresponsive dermatoses: Bath PUVA requiring at least 4 to 8 hours of care under direct physician supervision. Comments On Previous Treatment: Well tolerated. Render Post-Care In The Note: no Detail Level: Generalized Protocol For Photochemotherapy: Mineral Oil And Nbuvb: The patient received Photochemotherapy: Mineral Oil and NBUVB (mineral oil applied to all lesions prior to phototherapy). Protocol For Uva1: The patient received UVA1. Protocol For Photochemotherapy: Baby Oil And Nbuvb: The patient received Photochemotherapy: Baby Oil and NBUVB (baby oil applied to all lesions prior to phototherapy). Protocol For Photochemotherapy For Severe Photoresponsive Dermatoses: Tar And Broad Band Uvb (Goeckerman Treatment): The patient received Photochemotherapy for severe photoresponsive dermatoses: Tar and Broad Band UVB (Goeckerman treatment) requiring at least 4 to 8 hours of care under direct physician supervision. Protocol For Bath Puva: The patient received Bath PUVA. Protocol For Photochemotherapy: Tar And Broad Band Uvb (Goeckerman Treatment): The patient received Photochemotherapy: Tar and Broad Band UVB (Goeckerman treatment). Protocol For Photochemotherapy For Severe Photoresponsive Dermatoses: Petrolatum And Broad Band Uvb: The patient received Photochemotherapyfor severe photoresponsive dermatoses: Petrolatum and Broad Band UVB requiring at least 4 to 8 hours of care under direct physician supervision. Protocol For Photochemotherapy: Tar And Nbuvb (Goeckerman Treatment): The patient received Photochemotherapy: Tar and NBUVB (Goeckerman treatment). Protocol For Nbuvb: The patient received NBUVB. Treatment Number: 100 [facility] Total Body Energy: 618 mj Treatment Number: [address] Protocol For Uva: The patient received UVA. Name Of Supervising Technician: elizabeth diez Protocol For Nb Uva: The patient received NB UVA. Post-Care Instructions: I reviewed with the patient in detail post-care instructions. Patient is to wear sun protection. Patients may expect sunburn like redness, discomfort and scabbing. Protocol For Puva: The patient received PUVA. stated

## 2025-03-20 NOTE — ED PROVIDER NOTE - SKIN COLOR
+Bilateral hips with punctate injection sites, no overlying erythema, no hematoma, no drainage, compartments soft, no tenderness over the hip, patient is ambulating.

## 2025-03-20 NOTE — ED ADULT TRIAGE NOTE - CHIEF COMPLAINT QUOTE
pt had "Body filler" injections to her hips today now feeling pain at injection sites, metallic taste in mouth, chest tightness and SOB

## 2025-03-20 NOTE — ED PROVIDER NOTE - NSFOLLOWUPINSTRUCTIONS_ED_ALL_ED_FT
You have been evaluated in the Emergency Department today for Chest pain and tingling.    Follow up with your PCP in 2 days.     Your lab work and chest x-ray were negative, no signs of emergencies.    When you are at home please make sure to monitor your injection sites to make sure there are no signs of infection, no drainage, no spreading redness.    Return to the emergency department for any fevers at home, abdominal pain, skin changes over the hips, difficulty walking, chest pain, loss of consciousness or any other concerning symptoms.

## 2025-03-20 NOTE — ED PROVIDER NOTE - PROGRESS NOTE DETAILS
[place] PGY-1 DO:   Patient's lab work including troponin, chest x-ray, EKG are all unremarkable.  This is likely anxiety induced symptoms.  No overlying skin changes at body filler injection sites, patient is able to ambulate.  Explained to patient tingling in her fingertips and toes are likely due to her hyperventilation.  After Toradol for pain, patient states she feels much better and would like to go home.  Will follow-up with her PCP, strict return precautions, agreeable with plan.

## 2025-03-20 NOTE — ED ADULT TRIAGE NOTE - GLASGOW COMA SCALE: SCORE, MLM
[FreeTextEntry1] : In summary the patient is a 77-year-old male with past medical history significant for asthma, chronic obstructive pulmonary disease, hypertension, anxiety, gastroesophageal reflux disorder presents for followup and continuation of his Xolair  therapy.\par \par Patient's physical exam is within normal limits.  Xolair 300 mg intramuscular given.  Patient instructed to continue current therapy and follow-up in 1 month\par \par  15

## 2025-03-20 NOTE — ED PROVIDER NOTE - PATIENT PORTAL LINK FT
You can access the FollowMyHealth Patient Portal offered by [facility] by registering at the following website: [email or web address]. By joining [facility]’s FollowMyHealth portal, you will also be able to view your health information using other applications (apps) compatible with our system.

## 2025-03-20 NOTE — ED ADULT NURSE NOTE - CAS EDN DISCHARGE INTERVENTIONS
Number Of Freeze-Thaw Cycles: 1 freeze-thaw cycle Render Post-Care Instructions In Note?: no Show Applicator Variable?: Yes Consent: The patient's verbal consent was obtained including but not limited to risks of crusting, scabbing, blistering, scarring, darker or lighter pigmentary change. Detail Level: Detailed Application Tool (Optional): Cry-AC Duration Of Freeze Thaw-Cycle (Seconds): 2 Post-Care Instructions: I reviewed with the patient in detail post-care instructions. Patient is to wear sunprotection, and avoid picking at any of the treated lesions. Pt may apply Vaseline to crusted or scabbing areas. IV discontinued, cath removed intact

## 2025-03-20 NOTE — ED ADULT NURSE NOTE - NSSEPSISSUSPECTED_ED_A_ED
[facility] Discharge Summary and Instructions    Sarabjit Calvin MRN# [id number]   Age: 65 year old YOB: 1952     Date of Admission:  4/18/2018  Date of Discharge::  4/24/2018  Admitting Physician:  Miky Garcia MD  Discharge Physician:  Miky Garcia MD          Admission Diagnoses:   Tumor   Brain lesion          Discharge Diagnosis:   Glioblastoma (WHO grade IV)             - IDH1 [id number] absent by immunohistochemistry             - ATRX-wild-type by immunohistochemistry           Procedures:   4/18/18  1) Left craniotomy for tumor resection, awake  2) Electrocorticography           Brief History of Illness:   Mr. Calvin is a 65 year old male who developed slurred speech and right hand weakness in March, 2018.  He underwent MRI imaging which demonstrated a left frontal, pre-motor tumor.  Surgical resection via awake craniotomy was recommended.             Hospital Course:   Patient underwent above-mentioned procedure on 4/18/18. The operation was complicated by worsened right upper extremity weakness as well as mild-moderate expressive and receptive aphasia.  He was admitted to the surgical ICU for routine post operative cares.  Post-operative MRI demonstrated gross-total tumor resection with significant cerebral edema surrounding the resection cavity as well as a left M2 infarct.  He was evaluated by speech therapist who ultimately recommended a regular diet as well as ongoing speech therapy for aphasia.  He was started on 7 days of Keppra for seizure prophylaxis (to end 4/25) as well as Decradron (4 mg every 8 hours) for cerebral edema.   On post operative day 2 he was doing well and transferred to the floor.  He continued to work with the speech therapists as well as physical and occupational therapy who recommended post-hospitalization stay at a rehab facility for further acute rehabilitation.  Post-operatively, aphasia and right upper extremity weakness  "improved significantly.  On post operative day 6, he was working with therapists, voiding without a jennings, eating a regular diet, pain was well cntrolled and therefore he was discharged to acute rehabilitation.    MRSA screening negative from 4/18/18 and VRE screen is pending at time of discharge.   Follow-up instructions:   1) Follow-up with neurosurgeon Ezequiel Brown for post-operative check in [place]     2) Will coordinate with local Radiation Oncology and Neuro Oncology team for Oncologic follow-up and care in [place] to minimize travel burden back to [facility].     3) Ok to restart cyclosporine on May 9, 2018.     4) decadron to be continued until follow-up with Oncologic team   Exam on Discharge  Physical exam:   Blood pressure 145/77, pulse 62, temperature 97.7  F (36.5  C), temperature source Oral, resp. rate 16, height 1.778 m (5' 10\"), weight 70.1 kg (154 lb 8.7 oz), SpO2 99 %.  NEUROLOGIC:  -- Awake; Alert; oriented x 2  -- moderate expressive and mild receptive aphasia, able to follow commands   -- R sided neglect   Cranial Nerves:  -- PERRL 3-2mm bilat and brisk, extraocular movements intact  -- face asymmetric with right lower facial droop   -- palate elevates symmetrically, uvula midline  -- hearing grossly intact   -- Trapezii symmetric strength   Motor:   Delt Bi Tri Hand Flexion/  Extension Iliopsoas Quadriceps Hamstrings Tibialis Anterior Gastroc    C5 C6 C7 C8/T1 L2 L3 L4-S1 L4 S1   R 4- 4- 4- 4- 5 5 5 5 5   L 5 5 5 5 5 5 5 5 5   Sensory: grossly intact to light touch           Discharge Medications:     Current Discharge Medication List      START taking these medications    Details   dexamethasone (DECADRON) 4 MG tablet Take 1 tablet (4 mg) by mouth every 8 hours  Qty: 100 tablet, Refills: 0    Associated Diagnoses: Glioblastoma multiforme (H)      levETIRAcetam (KEPPRA) 750 MG tablet Take 1 tablet (750 mg) by mouth 2 times daily for 1 day  Qty: 2 tablet, Refills: 0    Associated Diagnoses: Glioblastoma " multiforme (H)      pantoprazole (PROTONIX) 40 MG EC tablet Take 1 tablet (40 mg) by mouth every morning  Qty: 60 tablet, Refills: 0    Associated Diagnoses: Glioblastoma multiforme (H)         CONTINUE these medications which have CHANGED    Details   atorvastatin (LIPITOR) 10 MG tablet Take 1 tablet (10 mg) by mouth every morning  Qty: 30 tablet, Refills: 0    Associated Diagnoses: Hyperlipidemia, unspecified hyperlipidemia type      cycloSPORINE (SANDIMMUNE) 25 MG capsule Take 2 capsules (50 mg) by mouth 2 times daily  Qty: 100 capsule, Refills: 0    Associated Diagnoses: Interstitial nephritis chronic      metoprolol tartrate (LOPRESSOR) 25 MG tablet Take 1 tablet (25 mg) by mouth 3 times daily  Qty: 60 tablet, Refills: 0    Associated Diagnoses: Hypertension, unspecified type         CONTINUE these medications which have NOT CHANGED    Details   BUMETANIDE PO Take 1 mg by mouth 2 times daily         STOP taking these medications       POTASSIUM CHLORIDE PO Comments:   Reason for Stopping:                    Discharge Instructions and Follow-Up:   Discharge diet: Regular   Discharge activity: You may advance activity as tolerated. No strenuous exercise or heavy lifting greater than 10 lbs for 4 weeks or until seen and cleared in clinic.   Discharge follow-up: Follow-up with neurosurgeon Ezequiel Brown for post-operative check in [place]    Will coordinate with local Radiation Oncology and Neuro Oncology team for Oncologic follow-up and care in [place] to minimize travel burden back to [facility].     Ok to restart cyclosporine on May 9, 2018.     Decadron to be continued with dosing to be determined by oncologic team at [place]    Wound care: Ok to shower,however no scrubbing of the wound and no soaking of the wound, meaning no bathtubs or swimming pools. Pat dry only. Leave wound open to air.    Sutures are absorbable and do not need to be removed.       Please call if you have:  1. increased pain, redness, drainage,  swelling at your incision  2. fevers > 101.5 F degrees  3. with any questions or concerns.  You may reach the Neurosurgery clinic at [phone] during regular work hours. ER at [phone].    and ask for the Neurosurgery Resident on call at [phone], during off hours or weekends.         Discharge Disposition:   Discharged to acute rehabilitation unit            No

## 2025-07-09 NOTE — ED PROVIDER NOTE - DISCHARGE REVIEW MATERIAL PRESENTED
Meli Rodriguez is a 50 year old patient who is being evaluated via a billable virtual visit.       How would you like to obtain your AVS? MyChart  If the video visit is dropped, the invitation should be resent by: Text to cell phone: [phone]  Will anyone else be joining your video visit? No    Video-Visit Details     Type of service:  Video Visit     Video Start Time (time video started): 10: 22     Video End Time (time video stopped): 10: 34    Additional 35 minutes on the date of service was spent performing the following:   -Preparing to see the patient (eg, review of tests,providers progress notes, medical/surgical history,etc) ;  -Ordering medications, tests, or procedures;   -Documenting clinical information in the electronic or other health record.  Total time: 47 min     Physician has received verbal consent for a Video Visit from the patient? Yes     Originating Location (pt. Location): Other outdoor    Distant Location (provider location):  Off-site    Platform used for Video Visit: [address] [address]  Phone: [phone]    Patient:  Meli Rodriguez, Date of birth 1974  Date of Visit:  7/10/25     GASTROENTEROLOGY RETURN PATIENT VIDEO VISIT    CC/REFERRING MD:    Sharmaine Christensen    REASON FOR CONSULTATION:   Referred for Follow Up (Other chronic pain /Hx of colonoscopy /History of esophagogastroduodenoscopy (EGD) /History of CT scan /Chronic abdominal pain/Weight loss/)      HISTORY OF PRESENT ILLNESS:  Meli Rodriguez is a 50 year old female who presents to GI clinic for worsening in chronic abdominal pain.  I am not familiar with the patient, she was previously seen by Dr. Damico and GI providers at [facility].  PMH significant for borderline intellectual functioning, bipolar 2, PTSD, migraine, polysubstance use including chronic opioid use, anxiety, TBI, and COPD. Hx of cholecystectomy.    Functional GI tract  "disorder was suspected as a likely explanation of multiple GI concerns.   Noted that the patient was seen in the ED X 12 times this year, mainly for pain (chest, back, abdomen). At times, was having nausea and vomiting. Had unremarkable CT in April. Declined x-ray on her last visit on 7/6/2025. Unremarkable lab work. Recommended to have EGD and colonoscopy. Patient requested NG tube placement or to be admitted for bowel prep. Was sent to Dr. Raymundo for a consultation but left AMA.    Today, the patient is tearful.  She complains of progressive weight loss and inability to eat due to postprandial nausea and abdominal pain.  She request to have a PICC line so she can get some nutrition replacement.  Denies vomiting, but stated that she takes ondansetron 3-4 times a day.  Abdominal pain is generalized, worse in bilateral upper abdomen.  Nothing helps the pain except Percocet, which she takes as needed.  Complains of burning in her chest and uncontrolled acid reflux.  Said that she tried antiacid medications before and they were not helping.  Currently, does not take any antiacids.  She denies dysphagia or odynophagia.  Complaints of having mushy and loose stools every day, \"never have solid stools!\"  Said that her stools looks like \"liquid snot\" and had \"terrific odor\".  She denies hematochezia or melena.  Stated that she will not be able to complete colonoscopy as she cannot drink MiraLAX or GoLytely due to persistent nausea.  Requests admission and tube placement.    Again, the patient's main concern today is poor oral intake with risk of nutrition/vitamin deficiencies.  Not interested in seeing a dietitian.  Denies history of eating disorder.  Stated that her gastrointestinal problems have been going on for about 20 years \"and nobody can figure it out\" why she is feeling sick all the time.  Noted that her weight was around 125 -130 pounds in 2023 and 2024.  She is down to 118 pounds.        Wt Readings from Last 10 " Encounters:   07/01/25 53.9 kg (118 lb 12.8 oz)   06/25/25 54.2 kg (119 lb 6.4 oz)   06/13/25 50.8 kg (112 lb)   05/27/25 56.7 kg (125 lb)   04/30/25 57.6 kg (127 lb)   03/26/25 57.6 kg (127 lb)   03/21/25 58 kg (127 lb 12.8 oz)   02/27/25 56.7 kg (125 lb)   02/25/25 58 kg (127 lb 12.8 oz)   02/24/25 57 kg (125 lb 9.6 oz)       PREVIOUS ENDOSCOPY:  6/22/2017 EGD at [facility]  LA Grade A esophagitis without bleeding in the distal esophagus.  Radha stomach and duodenum.  No specimens collected    5/16/2017 Colonoscopy  Findings:       The perianal and digital rectal examinations were normal.        The terminal ileum appeared normal.        A 4 mm polyp was found at the hepatic flexure. The polyp was sessile.        The polyp was removed with a cold snare. Resection and retrieval were        complete.        The exam was otherwise normal throughout the examined colon.        Biopsies were taken with a cold forceps in the entire colon for        histology.        Non-bleeding internal hemorrhoids were found during retroflexion. The        hemorrhoids were moderate.        There is no endoscopic evidence of inflammation in the terminal ileum.        There is no endoscopic evidence of colitis in the entire colon.   Impression:       - The examined portion of the ileum was normal.        - One 4 mm polyp at the hepatic flexure. Resected and retrieved.        - Non-bleeding internal hemorrhoids.        - Biopsies were taken with a cold forceps for histology in the entire        colon.   Final Diagnosis    A.  Colon, hepatic flexure, polyp, polypectomy- Tubular adenoma     B.  Colon, cecum, biopsy - Colonic mucosa without diagnostic abnormality     C.  Terminal ileum, biopsy - Ileal mucosa without diagnostic abnormality      D.  Ileocecal valve, biopsy - Ileal and colonic mucosa without diagnostic abnormality      E.  Ascending colon, biopsy - Colonic mucosa without diagnostic abnormality      F.  Transverse colon, biopsy -  Colonic mucosa without diagnostic abnormality      G.  Descending colon, biopsy - Colonic mucosa without diagnostic abnormality       H.  Sigmoid colon, biopsy - Colonic mucosa without diagnostic abnormality       I.  Sigmoid colon, polyp, polypectomy- Sessile serrated adenoma     J.  Rectum, biopsy - Colonic mucosa without diagnostic abnormality          PERTINENT IMAGING STUDIES WERE REVIEWED IN EMR  4/5/2025 CT abdomen/pelvis  FINDINGS:   LOWER CHEST: Small hiatal hernia.     HEPATOBILIARY: Cholecystectomy. No biliary ductal dilation. Scattered tiny hepatic cysts. No suspicious liver lesions.     PANCREAS: Normal.     SPLEEN: Normal.     ADRENAL GLANDS: Normal.     KIDNEYS/BLADDER: Tiny left renal cyst. No urinary calculi or hydronephrosis. Normal bladder.     BOWEL: No bowel obstruction or inflammation. Appendix is absent. Apparent mild wall thickening of the sigmoid colon is favored to relate to its decompressed state.     LYMPH NODES: No enlarged lymph nodes.     VASCULATURE: Patent portal, splenic, and superior mesenteric veins. Mild aortic atherosclerosis. No abdominal aortic aneurysm.     PELVIC ORGANS: Absent uterus.     MUSCULOSKELETAL: Mild degenerative changes of the spine. No acute bony abnormality or destructive bone lesions.                                                                      IMPRESSION:      1.  No acute abnormality or specific cause of the patient's symptoms are identified.      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), Bipolar 2 disorder (H), Blood in feces (08/13/2012), Calculi, ureter (05/22/2012), Cannabinoid hyperemesis syndrome (09/12/2023), Colitis, Contusion of duodenum (06/26/2012), De Quervain's disease  (tenosynovitis) (12/01/2021), Depressive disorder, Drug-seeking behavior, Ectopic pregnancy, tubal (03/28/2009), Gastroenteritis (09/12/2023), GERD (gastroesophageal reflux disease), Head injury, Hematemesis with nausea (07/10/2023), Hematochezia (06/26/2012), Irritable bowel syndrome (IBS), Lower abdominal pain (07/10/2023), Lower urinary tract infection (02/18/2011), Migraine, Neuralgia neuritis, sciatic nerve (02/18/2012), PTSD (post-traumatic stress disorder), and Retention of urine (03/28/2009).     has a past surgical history that includes Urethra surgery; Cholecystectomy (2004); hysterectomy, pap no longer indicated (2001); appendectomy (08/16/2007); Tonsillectomy, adenoidectomy, myringotomy, insert tube bilateral, combined; tubal ligation (2000); carpal tunnel release rt/lt (Left, 2009); GYN surgery (1999); Cystoscopy (2012); orthopedic surgery (2002); ENT surgery (1981); carpal tunnel release rt/lt (Right, 2003); and Suprapubic Catheter Insertion (2006).     reports that she has been smoking cigarettes. She has been exposed to tobacco smoke. She has never used smokeless tobacco. She reports current drug use. Drug: Marijuana. She reports that she does not drink alcohol.    family history includes Aneurysm in her maternal grandmother; Cerebrovascular Disease in her maternal grandfather; Chronic Obstructive Pulmonary Disease in her mother; Unknown/Adopted in her father.    ALLERGIES:     Allergies   Allergen Reactions    Butalbital-Aspirin-Caffeine      Increases headaches    Demerol Itching    Droperidol Other (See Comments)     twitching      Hydroxyzine Unknown     Refuses IM numerous times stating that it does not work    Ketorolac Tromethamine Itching    Methocarbamol Hives    Metoclopramide Other (See Comments)     Cause acute dystonic reaction; Tolerated 3/3/24    Morpholine Salicylate     Nicotine      Intolerant to    Nsaids Other (See Comments)     Gastric ulcer    Pramipexole      Ill feel     Rizatriptan      Ill feel    Ropinirole      Increases RLS    Trazodone Other (See Comments)     Out of body sense     Bad dreams    Triptans Itching     vomits    Adhesive Tape Rash    Compazine [Prochlorperazine] Rash     Arm red and rash    Diphenhydramine Anxiety     Jittery and jumpy       PERTINENT MEDICATIONS:    Current Outpatient Medications:     albuterol (PROAIR HFA/PROVENTIL HFA/VENTOLIN HFA) 108 (90 Base) MCG/ACT inhaler, Inhale 2 puffs into the lungs every 6 hours as needed for shortness of breath, wheezing or cough, Disp: 18 g, Rfl: 1    ARIPiprazole (ABILIFY) 2 MG tablet, Take 1 tablet (2 mg) by mouth daily., Disp: 30 tablet, Rfl: 0    escitalopram (LEXAPRO) 10 MG tablet, Take 1 tablet (10 mg) by mouth daily., Disp: 30 tablet, Rfl: 0    gabapentin (NEURONTIN) 600 MG tablet, Take 1 tablet (600 mg) by mouth 3 times daily., Disp: 90 tablet, Rfl: 3    levothyroxine (SYNTHROID/LEVOTHROID) 25 MCG tablet, Take 1 tablet (25 mcg) by mouth every morning (before breakfast)., Disp: 30 tablet, Rfl: 1    ondansetron (ZOFRAN ODT) 4 MG ODT tab, Take 1 tablet (4 mg) by mouth every 8 hours as needed for nausea., Disp: 30 tablet, Rfl: 2    [START ON 7/10/2025] oxyCODONE-acetaminophen (PERCOCET) 5-325 MG tablet, Take 1 tablet by mouth daily. Since insurance won't fill 30 days. Ok to fill a day early as holiday is friday., Disp: 7 tablet, Rfl: 0    [START ON 9/8/2025] oxyCODONE-acetaminophen (PERCOCET) 5-325 MG tablet, Take 1 tablet by mouth daily as needed for pain., Disp: 30 tablet, Rfl: 0      ROS: 10pt ROS performed and otherwise negative.    PHYSICAL EXAMINATION:  Wt:   Wt Readings from Last 2 Encounters:   07/01/25 53.9 kg (118 lb 12.8 oz)   06/25/25 54.2 kg (119 lb 6.4 oz)      Physical Exam  Vitals reviewed: There were no vitals taken for this visit.   Constitutional: aaox3, cooperative, pleasant, not dyspneic/diaphoretic, no acute distress  Eyes: Sclera anicteric/injected  Respiratory: Unlabored breathing,  speaking in full sentences.   Psych: Normal affect, speech is clear and appropriate.Neatly groomed    RECENT LABS:   Recent Labs   Lab Test 07/06/25  1526 07/01/25  1028   WBC 8.7 10.1   HGB 13.3 14.7   HCT 38.3 43.2    217     Recent Labs   Lab Test 07/06/25  1526 07/01/25  1028   ALT 9 11   AST 17 25     Recent Labs   Lab Test 07/06/25  1526 07/01/25  1028   CR 0.67 0.65     TSH   Date Value Ref Range Status   07/01/2025 5.63 (H) 0.30 - 4.20 uIU/mL Final   09/24/2013 12.30 (H) 0.4 - 5.0 mU/L Final         ASSESSMENT/PLAN:    ICD-10-CM    1. Loose stools  R19.5 NM Gastric Emptying     Elastase Fecal     Calprotectin Feces     Fecal colorectal cancer screen FIT     Fecal Fat Qualitative Random     Helicobacter pylori Antigen Stool     Ova and Parasite Exam Routine     Focused Enteric Pathogen Panel by PCR     C. difficile Toxin B PCR with reflex to C. difficile EIA      2. Chronic abdominal pain  R10.9 Adult GI  Referral - Consult Only    G89.29 Adult GI  Referral - Procedure Only      3. Weight loss  R63.4 Adult GI  Referral - Consult Only     Adult GI  Referral - Procedure Only     Ova and Parasite Exam Routine     Focused Enteric Pathogen Panel by PCR     C. difficile Toxin B PCR with reflex to C. difficile EIA      4. Poor appetite  R63.0 Adult GI  Referral - Procedure Only      5. Nausea  R11.0 Adult GI  Referral - Procedure Only         Meli Rodriguez is a 50 year old female who presents to GI clinic for a follow up.  I am not familiar with the patient and this is my first encounter with her.  She is complex health history including PTSD, anxiety, borderline personality disorder, cholecystectomy, functional GI tract disorder, cannabinoid hyperemesis syndrome, chronic opiate use, and COPD.  Patient complains of persistent nausea and worsening in chronic pain.  She is concerned about nutritional deficiency due to reduced oral intake.  Requested a PICC  line placement for TPN.  She also mentioned that she needs to be admitted for tube placement for bowel preparation.  Noted the patient had colonoscopy in 2017 with findings of 2 small tubular adenomas.  EGD revealed esophagitis.  No signs of H. pylori infection.  Recently, she had normal CT scan of abdomen.  Unremarkable CMP and CBC, normal lipase, normal iron studies.  No signs of nutritional deficiencies.  She had some weight loss in the past 2 years, approximately 7 to 8 pounds.  I explained to the patient that I do not see indication for parenteral nutrition at this time as she is able to swallow solid foods.  Patient is not interested in seeing dietitian.  Agreed to proceed with upper GI endoscopy and gastric emptying study.  She will collect stool specimen for studies.  She declined a trial of a PPI and mirtazapine.  Will continue ondansetron as needed for nausea.  Would benefit from a consultation with GI psychologist; this was not discussed today.  Patient verbalized understanding and appreciation of care provided. Stated that all of the questions were answered to her/his satisfaction.  Follow up in 2-3 months  This note was created with Dragon voice recognition software. Inadvertent minor typographic errors may occur in transcription. Feel free to contact the provider if you have any questions.    TAMI Balderas  [facility],  Gastroenterology,  [place], [place]     .